# Patient Record
Sex: FEMALE | Race: WHITE | NOT HISPANIC OR LATINO | Employment: OTHER | URBAN - METROPOLITAN AREA
[De-identification: names, ages, dates, MRNs, and addresses within clinical notes are randomized per-mention and may not be internally consistent; named-entity substitution may affect disease eponyms.]

---

## 2023-04-10 PROBLEM — M25.562 ARTHRALGIA OF BOTH KNEES: Status: ACTIVE | Noted: 2023-04-10

## 2023-04-10 PROBLEM — F41.9 ANXIETY DISORDER: Status: ACTIVE | Noted: 2023-04-10

## 2023-04-10 PROBLEM — M54.50 LOWER BACK PAIN: Status: ACTIVE | Noted: 2023-04-10

## 2023-04-10 PROBLEM — M25.561 ARTHRALGIA OF BOTH KNEES: Status: ACTIVE | Noted: 2023-04-10

## 2023-04-10 PROBLEM — K21.9 GERD (GASTROESOPHAGEAL REFLUX DISEASE): Status: ACTIVE | Noted: 2023-04-10

## 2023-04-10 PROBLEM — I10 HYPERTENSION: Status: ACTIVE | Noted: 2023-04-10

## 2023-04-10 PROBLEM — H93.13 TINNITUS, SUBJECTIVE, BILATERAL: Status: ACTIVE | Noted: 2023-04-10

## 2023-04-10 PROBLEM — H90.3 SENSORINEURAL HEARING LOSS, BILATERAL: Status: ACTIVE | Noted: 2023-04-10

## 2023-04-10 PROBLEM — E78.5 HYPERLIPIDEMIA: Status: ACTIVE | Noted: 2023-04-10

## 2023-04-10 PROBLEM — J30.89 OTHER ALLERGIC RHINITIS: Status: ACTIVE | Noted: 2023-04-10

## 2023-04-10 PROBLEM — M25.50 ARTHRALGIA OF MULTIPLE JOINTS: Status: ACTIVE | Noted: 2023-04-10

## 2023-04-10 RX ORDER — FLUTICASONE PROPIONATE 50 MCG
2 SPRAY, SUSPENSION (ML) NASAL DAILY
COMMUNITY
Start: 2016-09-15

## 2023-04-10 RX ORDER — SPIRONOLACTONE 50 MG/1
1 TABLET, FILM COATED ORAL DAILY
COMMUNITY
Start: 2013-06-24 | End: 2023-08-07 | Stop reason: SDUPTHER

## 2023-04-10 RX ORDER — OMEPRAZOLE 20 MG/1
1 CAPSULE, DELAYED RELEASE ORAL DAILY
COMMUNITY
Start: 2019-06-28

## 2023-04-10 RX ORDER — ROSUVASTATIN CALCIUM 5 MG/1
1 TABLET, COATED ORAL DAILY
COMMUNITY
Start: 2017-10-25 | End: 2023-08-08 | Stop reason: SDUPTHER

## 2023-04-10 RX ORDER — ESCITALOPRAM OXALATE 10 MG/1
1 TABLET ORAL DAILY
COMMUNITY
Start: 2021-12-22 | End: 2023-10-24 | Stop reason: ALTCHOICE

## 2023-04-10 RX ORDER — METOPROLOL SUCCINATE 50 MG/1
1 TABLET, EXTENDED RELEASE ORAL DAILY
COMMUNITY
Start: 2017-04-19 | End: 2023-11-10 | Stop reason: SDUPTHER

## 2023-04-10 RX ORDER — PHENYLPROPANOLAMINE/CLEMASTINE 75-1.34MG
TABLET, EXTENDED RELEASE ORAL 2 TIMES DAILY
COMMUNITY
Start: 2020-11-19

## 2023-08-07 DIAGNOSIS — E78.5 HYPERLIPIDEMIA, UNSPECIFIED HYPERLIPIDEMIA TYPE: ICD-10-CM

## 2023-08-07 DIAGNOSIS — I10 HYPERTENSION, UNSPECIFIED TYPE: ICD-10-CM

## 2023-08-08 RX ORDER — SPIRONOLACTONE 50 MG/1
50 TABLET, FILM COATED ORAL DAILY
Qty: 90 TABLET | Refills: 1 | Status: SHIPPED | OUTPATIENT
Start: 2023-08-08 | End: 2023-08-08 | Stop reason: SDUPTHER

## 2023-08-08 RX ORDER — ROSUVASTATIN CALCIUM 5 MG/1
5 TABLET, COATED ORAL DAILY
Qty: 90 TABLET | Refills: 0 | Status: SHIPPED | OUTPATIENT
Start: 2023-08-08 | End: 2023-11-07

## 2023-08-08 RX ORDER — SPIRONOLACTONE 50 MG/1
50 TABLET, FILM COATED ORAL DAILY
Qty: 90 TABLET | Refills: 0 | Status: SHIPPED | OUTPATIENT
Start: 2023-08-08 | End: 2023-11-07

## 2023-08-08 NOTE — TELEPHONE ENCOUNTER
Requested Prescriptions     Pending Prescriptions Disp Refills    spironolactone (Aldactone) 50 mg tablet 90 tablet 0     Sig: Take 1 tablet (50 mg) by mouth once daily.    rosuvastatin (Crestor) 5 mg tablet 90 tablet 0     Sig: Take 1 tablet (5 mg) by mouth once daily.     Signed Prescriptions Disp Refills    spironolactone (Aldactone) 50 mg tablet 90 tablet 1     Sig: Take 1 tablet (50 mg) by mouth once daily.     Authorizing Provider: TATY AJ

## 2023-08-23 ENCOUNTER — LAB (OUTPATIENT)
Dept: LAB | Facility: LAB | Age: 63
End: 2023-08-23
Payer: COMMERCIAL

## 2023-08-23 DIAGNOSIS — E78.5 HYPERLIPIDEMIA, UNSPECIFIED HYPERLIPIDEMIA TYPE: ICD-10-CM

## 2023-08-23 LAB
ALANINE AMINOTRANSFERASE (SGPT) (U/L) IN SER/PLAS: 44 U/L (ref 7–45)
ALBUMIN (G/DL) IN SER/PLAS: 4.3 G/DL (ref 3.4–5)
ALKALINE PHOSPHATASE (U/L) IN SER/PLAS: 84 U/L (ref 33–136)
ANION GAP IN SER/PLAS: 14 MMOL/L (ref 10–20)
ASPARTATE AMINOTRANSFERASE (SGOT) (U/L) IN SER/PLAS: 26 U/L (ref 9–39)
BASOPHILS (10*3/UL) IN BLOOD BY AUTOMATED COUNT: 0.05 X10E9/L (ref 0–0.1)
BASOPHILS/100 LEUKOCYTES IN BLOOD BY AUTOMATED COUNT: 0.5 % (ref 0–2)
BILIRUBIN TOTAL (MG/DL) IN SER/PLAS: 0.4 MG/DL (ref 0–1.2)
CALCIUM (MG/DL) IN SER/PLAS: 9.3 MG/DL (ref 8.6–10.3)
CARBON DIOXIDE, TOTAL (MMOL/L) IN SER/PLAS: 21 MMOL/L (ref 21–32)
CHLORIDE (MMOL/L) IN SER/PLAS: 106 MMOL/L (ref 98–107)
CHOLESTEROL (MG/DL) IN SER/PLAS: 167 MG/DL (ref 0–199)
CHOLESTEROL IN HDL (MG/DL) IN SER/PLAS: 41.4 MG/DL
CHOLESTEROL/HDL RATIO: 4
CREATINE KINASE (U/L) IN SER/PLAS: 183 U/L (ref 0–215)
CREATININE (MG/DL) IN SER/PLAS: 0.83 MG/DL (ref 0.5–1.05)
EOSINOPHILS (10*3/UL) IN BLOOD BY AUTOMATED COUNT: 0.2 X10E9/L (ref 0–0.7)
EOSINOPHILS/100 LEUKOCYTES IN BLOOD BY AUTOMATED COUNT: 2.2 % (ref 0–6)
ERYTHROCYTE DISTRIBUTION WIDTH (RATIO) BY AUTOMATED COUNT: 13.2 % (ref 11.5–14.5)
ERYTHROCYTE MEAN CORPUSCULAR HEMOGLOBIN CONCENTRATION (G/DL) BY AUTOMATED: 32.9 G/DL (ref 32–36)
ERYTHROCYTE MEAN CORPUSCULAR VOLUME (FL) BY AUTOMATED COUNT: 92 FL (ref 80–100)
ERYTHROCYTES (10*6/UL) IN BLOOD BY AUTOMATED COUNT: 4.85 X10E12/L (ref 4–5.2)
GFR FEMALE: 78 ML/MIN/1.73M2
GLUCOSE (MG/DL) IN SER/PLAS: 87 MG/DL (ref 74–99)
HEMATOCRIT (%) IN BLOOD BY AUTOMATED COUNT: 44.4 % (ref 36–46)
HEMOGLOBIN (G/DL) IN BLOOD: 14.6 G/DL (ref 12–16)
IMMATURE GRANULOCYTES/100 LEUKOCYTES IN BLOOD BY AUTOMATED COUNT: 0.3 % (ref 0–0.9)
LDL: 98 MG/DL (ref 0–99)
LEUKOCYTES (10*3/UL) IN BLOOD BY AUTOMATED COUNT: 9.3 X10E9/L (ref 4.4–11.3)
LYMPHOCYTES (10*3/UL) IN BLOOD BY AUTOMATED COUNT: 2.99 X10E9/L (ref 1.2–4.8)
LYMPHOCYTES/100 LEUKOCYTES IN BLOOD BY AUTOMATED COUNT: 32.2 % (ref 13–44)
MAGNESIUM (MG/DL) IN SER/PLAS: 2.19 MG/DL (ref 1.6–2.4)
MONOCYTES (10*3/UL) IN BLOOD BY AUTOMATED COUNT: 0.63 X10E9/L (ref 0.1–1)
MONOCYTES/100 LEUKOCYTES IN BLOOD BY AUTOMATED COUNT: 6.8 % (ref 2–10)
NEUTROPHILS (10*3/UL) IN BLOOD BY AUTOMATED COUNT: 5.39 X10E9/L (ref 1.2–7.7)
NEUTROPHILS/100 LEUKOCYTES IN BLOOD BY AUTOMATED COUNT: 58 % (ref 40–80)
PLATELETS (10*3/UL) IN BLOOD AUTOMATED COUNT: 278 X10E9/L (ref 150–450)
POTASSIUM (MMOL/L) IN SER/PLAS: 4.8 MMOL/L (ref 3.5–5.3)
PROTEIN TOTAL: 6.8 G/DL (ref 6.4–8.2)
SODIUM (MMOL/L) IN SER/PLAS: 136 MMOL/L (ref 136–145)
THYROTROPIN (MIU/L) IN SER/PLAS BY DETECTION LIMIT <= 0.05 MIU/L: 2.48 MIU/L (ref 0.44–3.98)
TRIGLYCERIDE (MG/DL) IN SER/PLAS: 140 MG/DL (ref 0–149)
UREA NITROGEN (MG/DL) IN SER/PLAS: 21 MG/DL (ref 6–23)
VLDL: 28 MG/DL (ref 0–40)

## 2023-08-23 PROCEDURE — 85025 COMPLETE CBC W/AUTO DIFF WBC: CPT

## 2023-08-23 PROCEDURE — 84443 ASSAY THYROID STIM HORMONE: CPT

## 2023-08-23 PROCEDURE — 82550 ASSAY OF CK (CPK): CPT

## 2023-08-23 PROCEDURE — 83735 ASSAY OF MAGNESIUM: CPT

## 2023-08-23 PROCEDURE — 80061 LIPID PANEL: CPT

## 2023-08-23 PROCEDURE — 36415 COLL VENOUS BLD VENIPUNCTURE: CPT

## 2023-08-23 PROCEDURE — 80053 COMPREHEN METABOLIC PANEL: CPT

## 2023-08-29 NOTE — PROGRESS NOTES
Subjective   Patient ID: Judith Gr is a 63 y.o. female who presents for Annual Exam, Hospital Follow-up, and Vertigo.    HPI     Review of Systems    Objective   Wt 95.3 kg (210 lb)   BMI 32.89 kg/m²     Physical Exam    Assessment/Plan

## 2023-09-01 ENCOUNTER — OFFICE VISIT (OUTPATIENT)
Dept: PRIMARY CARE | Facility: CLINIC | Age: 63
End: 2023-09-01
Payer: COMMERCIAL

## 2023-09-01 VITALS
HEART RATE: 69 BPM | SYSTOLIC BLOOD PRESSURE: 128 MMHG | BODY MASS INDEX: 32.96 KG/M2 | DIASTOLIC BLOOD PRESSURE: 71 MMHG | HEIGHT: 67 IN | OXYGEN SATURATION: 98 % | WEIGHT: 210 LBS

## 2023-09-01 DIAGNOSIS — I10 PRIMARY HYPERTENSION: ICD-10-CM

## 2023-09-01 DIAGNOSIS — E78.2 MIXED HYPERLIPIDEMIA: ICD-10-CM

## 2023-09-01 DIAGNOSIS — F41.0 PANIC DISORDER WITHOUT AGORAPHOBIA: ICD-10-CM

## 2023-09-01 DIAGNOSIS — Z00.00 HEALTH CARE MAINTENANCE: Primary | ICD-10-CM

## 2023-09-01 DIAGNOSIS — Z12.31 VISIT FOR SCREENING MAMMOGRAM: ICD-10-CM

## 2023-09-01 PROCEDURE — 3074F SYST BP LT 130 MM HG: CPT | Performed by: INTERNAL MEDICINE

## 2023-09-01 PROCEDURE — 99213 OFFICE O/P EST LOW 20 MIN: CPT | Performed by: INTERNAL MEDICINE

## 2023-09-01 PROCEDURE — 99396 PREV VISIT EST AGE 40-64: CPT | Performed by: INTERNAL MEDICINE

## 2023-09-01 PROCEDURE — 3078F DIAST BP <80 MM HG: CPT | Performed by: INTERNAL MEDICINE

## 2023-09-01 PROCEDURE — 4004F PT TOBACCO SCREEN RCVD TLK: CPT | Performed by: INTERNAL MEDICINE

## 2023-09-01 RX ORDER — MECLIZINE HYDROCHLORIDE 25 MG/1
25 TABLET ORAL 3 TIMES DAILY PRN
COMMUNITY
Start: 2023-08-30

## 2023-09-01 RX ORDER — ONDANSETRON 4 MG/1
TABLET, ORALLY DISINTEGRATING ORAL
COMMUNITY
Start: 2023-08-30

## 2023-09-01 ASSESSMENT — PATIENT HEALTH QUESTIONNAIRE - PHQ9
1. LITTLE INTEREST OR PLEASURE IN DOING THINGS: NOT AT ALL
SUM OF ALL RESPONSES TO PHQ9 QUESTIONS 1 AND 2: 1
2. FEELING DOWN, DEPRESSED OR HOPELESS: SEVERAL DAYS

## 2023-09-01 NOTE — PROGRESS NOTES
"Subjective   Patient ID: Judith Gr is a 63 y.o. female who presents for Annual Exam, Hospital Follow-up, and Vertigo.    A lot of family health issues going on and has been stressful    Yesterday got very dizzy, was severe and made her want to vomit  She was worried that she was having a heart attack, her bp was 155/?  She couldn't talk or see, repeat bp was 182/?, then 15min later her bp went to 192/88  Called 911, went to ER, continued to be nauseated and dizzy  Has had a mild cough and runny nose, but gets that this time of year    She has still been depressed even on the escitalopram.  Feels lethargic, very sad and tearful, if she doesn't kick out of it she will get a panic attack with rapid heart beat and couldn't breath and feeling she needs to run  About 2 months ago noted that occasionally those attacks will come back  She talks herself out of going to her dark place  She takes the dogs on walks, she doesn't drive when she feels that way    No cp or pressure  No sob  If she overworks in the yard, if she is shoveling or working she gets an epigastric discomfort  Her insurance got her an invite to the [employer] program  She is being more active  She is upset that she is gaining weight.   She started to do Danial chi  She does a lot of outdoor work  Her arthritis has been bothering her more.   Bowels are more constipated due to the contrast, in general have been good.                Review of Systems    Objective   /71   Pulse 69   Ht 1.702 m (5' 7\")   Wt 95.3 kg (210 lb)   SpO2 98%   BMI 32.89 kg/m²     Physical Exam  Constitutional:       Appearance: Normal appearance.   Neck:      Vascular: No carotid bruit.   Cardiovascular:      Rate and Rhythm: Normal rate and regular rhythm.      Heart sounds: No murmur heard.  Pulmonary:      Effort: Pulmonary effort is normal.      Breath sounds: Normal breath sounds.   Musculoskeletal:      Right lower leg: No edema.      Left lower leg: No edema. "   Lymphadenopathy:      Cervical: No cervical adenopathy.   Neurological:      Mental Status: She is alert.      Comments: No pronator drift  Finger to nose intact  Moore pike positive   Psychiatric:         Behavior: Behavior normal.         Assessment/Plan          Patient Instructions   Hypertension is well controlled, continue same medications    Cholesterol is great, continue rosuvastatin    Ct head reviewed, no stroke, no blockage, no aneurysm    Dizziness is likely viral, reproducible, if does not resolve in 2 weeks would consider vestibular therapy    Call [phone] to schedule mammogram    Bone density due in 2030    Colonoscopy due in 2030    Consider covid and flu shots in the fall/ocober    Prevnar 20 due after age 65    Anxiety, will check anxiety and depression scores  On escitalopram  Call and schedule with Sarah Gonzalez, counseling for anxiety and depressioin

## 2023-09-01 NOTE — PATIENT INSTRUCTIONS
Hypertension is well controlled, continue same medications    Cholesterol is great, continue rosuvastatin    Ct head reviewed, no stroke, no blockage, no aneurysm    Dizziness is likely viral, reproducible, if does not resolve in 2 weeks would consider vestibular therapy    Call [phone] to schedule mammogram    Bone density due in 2030    Colonoscopy due in 2030    Consider covid and flu shots in the fall/ocober    Prevnar 20 due after age 65    Anxiety, will check anxiety and depression scores  On escitalopram  Call and schedule with Sarah Gonzalez, counseling for anxiety and depressioin

## 2023-10-21 RX ORDER — METOPROLOL TARTRATE 50 MG/1
50 TABLET ORAL 2 TIMES DAILY
COMMUNITY
End: 2023-11-10 | Stop reason: CLARIF

## 2023-10-24 ENCOUNTER — OFFICE VISIT (OUTPATIENT)
Dept: OTOLARYNGOLOGY | Facility: CLINIC | Age: 63
End: 2023-10-24
Payer: COMMERCIAL

## 2023-10-24 VITALS — WEIGHT: 211.7 LBS | TEMPERATURE: 97.4 F | BODY MASS INDEX: 33.16 KG/M2

## 2023-10-24 DIAGNOSIS — H90.3 SENSORINEURAL HEARING LOSS (SNHL) OF BOTH EARS: ICD-10-CM

## 2023-10-24 DIAGNOSIS — M54.2 NECK PAIN: ICD-10-CM

## 2023-10-24 DIAGNOSIS — R42 VERTIGO: Primary | ICD-10-CM

## 2023-10-24 PROCEDURE — 99214 OFFICE O/P EST MOD 30 MIN: CPT | Performed by: NURSE PRACTITIONER

## 2023-10-24 PROCEDURE — 4004F PT TOBACCO SCREEN RCVD TLK: CPT | Performed by: NURSE PRACTITIONER

## 2023-10-24 ASSESSMENT — PATIENT HEALTH QUESTIONNAIRE - PHQ9
SUM OF ALL RESPONSES TO PHQ9 QUESTIONS 1 AND 2: 0
2. FEELING DOWN, DEPRESSED OR HOPELESS: NOT AT ALL
1. LITTLE INTEREST OR PLEASURE IN DOING THINGS: NOT AT ALL

## 2023-10-24 NOTE — PROGRESS NOTES
Subjective   Patient ID: Judith Gr is a 63 y.o. female who presents for Vertigo.  HPI  This patient is referred for evaluation of  episodic vertiginous sometimes positional dizziness. The patient is accompanied by her . The approximate duration of her complaints is almost 2 months.    The patient describes her dizziness as sudden onset of vertigo on 8/30/2023.  Patient reports that spinning lasted hours nonstop.  She was evaluated in the ED.  CT angio with contrast head and neck and CT head without contrast did not show any significant findings.  Once the spinning stopped, she is felt off balanced when walking.  She also reports occasional positionally triggered episodes of vertigo that are short lasting.  Of note, when she had the severe episode she recalls having neck pain in conjunction.  She has had several significant life events leading to increased stress.  When asked about ear pain, headache, phono-photophobia, visual or motion intolerance, sound or pressure induced symptoms, hearing loss, discharge from ear, tinnitus, aural fullness or autophony, the patient admits to history of migraines (no recent pain but has developed ocular migraines), photosensitivity, lifelong motion intolerance, bilateral hearing loss.  She has worn hearing aids for years.  She recently got new hearing aids and is uncertain if she is not happy with their performance or if her hearing loss has progressed.  She really only notices difficulty with talking to her  who has a low pitched voice.  She has previously been seen by Dr. Love.  Patient reports seeing her PCP after her ED visit who described positionally triggered nystagmus concerning for BPPV.    Review of Systems  A comprehensive or 10 points review of the patient´s constitutional, neurological, HEENT, pulmonary, cardiovascular and genito-urinary systems showed only those mentioned in history of present illness.    Objective   Physical Exam  Constitutional:  no fever, chills, weight loss or weight gain   General appearance: Appears well, well-nourished, well groomed. No acute distress.   Communication: Normal communication   Psychiatric: Oriented to person, place and time. Normal mood and affect.   Neurologic: Cranial nerves II-XII grossly intact and symmetric bilaterally.   Head and Face:   Head: Atraumatic with no masses, lesions or scarring.   Face: Normal symmetry, no paralysis, synkinesis or facial tic. No scars or deformities.     Eyes: Conjunctiva not edematous or erythematous   Ears: External inspection of ears with no deformity, scars or masses. Bilateral EACs clear and bilateral TMs intact with no signs of effusions     Neck: Normal appearing, symmetric, trachea midline.   Cardiovascular: Examination of peripheral vascular system shows no clubbing or cyanosis.   Respiratory: No respiratory distress increased work of breathing. Inspection of the chest with symmetric chest expansion and normal respiratory effort.   Skin: No rashes in the head or neck  Bedside occulomotor function assessment for ocular pursuits and saccades, spontaneous nystagmus was normal.  Head thrust negative bilaterally  Romberg unsteady, patient swaying right  Fukuda with wide-based gait.  Patient reports feeling pulled to the left, but no turning noted  Tandem gait normal  Sailaja-Hallpike negative bilaterally  My interpretation of the audiogram done 9/11/2023 is moderate to severe sensorineural hearing loss bilaterally.  Word recognition scores 88% on the right and 84% on the left.  Normal tympanograms bilaterally.  This is stable when compared to previous audiogram dated 9/12/2022.  Assessment/Plan     This patient presents for initial evaluation of acute acquired vertigo and neck pain as well as chronic bilateral sensorineural hearing loss.    Reassurance given that otologic exam today is normal.  Audiogram was reviewed in detail and has remained stable.  The physiology of balance control  was explained. The likely possible etiologies were reviewed. I believe the patient may have a peripheral vestibular disorder. I recommended further evaluation with VNG testing.  Pending those results, I will recommend PT.  If there is no vestibular hypofunction, I would recommend a general PT referral.  If there is vestibular hypofunction I would recommend a vestibular PT referral.  I am happy to discuss testing results over the phone.  We discussed that her description of symptoms with an unchanged audiogram is consistent with vestibular neuritis +/- BPPV versus migraine variant +/- cervicogenic dizziness.  If symptoms were caused by vestibular neuritis, she should not have any recurrence of long-lasting vertiginous episodes, but may have intermittent recurrences of BPPV.  If symptoms were migraine versus cervicogenic, PT may prevent recurrence.  Patient and family are in agreement with the plan.  All questions were answered to patient and family's satisfaction.      30 minutes was spent on this patient´s visit. More than 50% of that time was spent in counseling regarding the possible etiologies, test results, treatment options and coordinating care.    This note was created using speech recognition transcription software. Despite proofreading, several typographical errors might be present that might affect the meaning of the content. Please call with any questions.

## 2023-11-06 DIAGNOSIS — E78.5 HYPERLIPIDEMIA, UNSPECIFIED HYPERLIPIDEMIA TYPE: ICD-10-CM

## 2023-11-06 DIAGNOSIS — I10 HYPERTENSION, UNSPECIFIED TYPE: ICD-10-CM

## 2023-11-06 NOTE — TELEPHONE ENCOUNTER
Requested Prescriptions     Pending Prescriptions Disp Refills    rosuvastatin (Crestor) 5 mg tablet [Pharmacy Med Name: ROSUVASTATIN CALCIUM 5 MG TAB] 90 tablet 0     Sig: take 1 tablet by mouth once daily    spironolactone (Aldactone) 50 mg tablet [Pharmacy Med Name: SPIRONOLACTONE 50 MG TABLET] 90 tablet 0     Sig: take 1 tablet by mouth once daily

## 2023-11-07 RX ORDER — SPIRONOLACTONE 50 MG/1
50 TABLET, FILM COATED ORAL DAILY
Qty: 90 TABLET | Refills: 1 | Status: SHIPPED | OUTPATIENT
Start: 2023-11-07 | End: 2024-05-13 | Stop reason: SDUPTHER

## 2023-11-07 RX ORDER — ROSUVASTATIN CALCIUM 5 MG/1
5 TABLET, COATED ORAL DAILY
Qty: 90 TABLET | Refills: 1 | Status: SHIPPED | OUTPATIENT
Start: 2023-11-07 | End: 2024-05-07

## 2023-11-10 DIAGNOSIS — I10 PRIMARY HYPERTENSION: ICD-10-CM

## 2023-11-10 NOTE — TELEPHONE ENCOUNTER
Requested Prescriptions     Pending Prescriptions Disp Refills    metoprolol succinate XL (Toprol-XL) 50 mg 24 hr tablet 90 tablet 0     Sig: Take 1 tablet (50 mg) by mouth once daily.

## 2023-11-13 RX ORDER — METOPROLOL SUCCINATE 50 MG/1
50 TABLET, EXTENDED RELEASE ORAL DAILY
Qty: 90 TABLET | Refills: 2 | Status: SHIPPED | OUTPATIENT
Start: 2023-11-13

## 2024-01-12 ENCOUNTER — OFFICE VISIT (OUTPATIENT)
Dept: PRIMARY CARE | Facility: CLINIC | Age: 64
End: 2024-01-12
Payer: COMMERCIAL

## 2024-01-12 ENCOUNTER — ANCILLARY PROCEDURE (OUTPATIENT)
Dept: RADIOLOGY | Facility: CLINIC | Age: 64
End: 2024-01-12
Payer: COMMERCIAL

## 2024-01-12 VITALS
HEIGHT: 67 IN | WEIGHT: 210 LBS | BODY MASS INDEX: 32.96 KG/M2 | SYSTOLIC BLOOD PRESSURE: 120 MMHG | DIASTOLIC BLOOD PRESSURE: 83 MMHG | HEART RATE: 79 BPM

## 2024-01-12 DIAGNOSIS — J40 BRONCHITIS: ICD-10-CM

## 2024-01-12 DIAGNOSIS — R05.1 ACUTE COUGH: Primary | ICD-10-CM

## 2024-01-12 DIAGNOSIS — R05.1 ACUTE COUGH: ICD-10-CM

## 2024-01-12 DIAGNOSIS — H69.93 DYSFUNCTION OF BOTH EUSTACHIAN TUBES: ICD-10-CM

## 2024-01-12 PROCEDURE — 3079F DIAST BP 80-89 MM HG: CPT | Performed by: INTERNAL MEDICINE

## 2024-01-12 PROCEDURE — 3074F SYST BP LT 130 MM HG: CPT | Performed by: INTERNAL MEDICINE

## 2024-01-12 PROCEDURE — 87637 SARSCOV2&INF A&B&RSV AMP PRB: CPT

## 2024-01-12 PROCEDURE — 71046 X-RAY EXAM CHEST 2 VIEWS: CPT

## 2024-01-12 PROCEDURE — 99214 OFFICE O/P EST MOD 30 MIN: CPT | Performed by: INTERNAL MEDICINE

## 2024-01-12 PROCEDURE — 71046 X-RAY EXAM CHEST 2 VIEWS: CPT | Performed by: RADIOLOGY

## 2024-01-12 RX ORDER — METHYLPREDNISOLONE 4 MG/1
TABLET ORAL
Qty: 21 TABLET | Refills: 0 | Status: SHIPPED | OUTPATIENT
Start: 2024-01-12 | End: 2024-01-19

## 2024-01-12 RX ORDER — BENZONATATE 200 MG/1
200 CAPSULE ORAL 3 TIMES DAILY PRN
Qty: 42 CAPSULE | Refills: 0 | Status: SHIPPED | OUTPATIENT
Start: 2024-01-12 | End: 2024-02-11

## 2024-01-12 RX ORDER — AZITHROMYCIN 250 MG/1
TABLET, FILM COATED ORAL
Qty: 6 TABLET | Refills: 0 | Status: SHIPPED | OUTPATIENT
Start: 2024-01-12 | End: 2024-01-17

## 2024-01-12 ASSESSMENT — ENCOUNTER SYMPTOMS
COUGH: 1
FATIGUE: 1

## 2024-01-12 NOTE — PROGRESS NOTES
"Subjective   Patient ID: Judith Gr is a 63 y.o. female who presents for Sinusitis, Cough, and Fatigue.     Got sick about 6 days ago  Her  had illness about 3 days prior  Started with sneezing, then started coughing.   She is coughing non stop  Walking seems to make it better  Her chest hurts in the lower chest  She is have stress incontinence with the coughing  She has been taking mucinex and helps for about 2 hours and gives her a headache  Her whole head hurts and is losing her hearing  Is mostly clear phlegm and nasal drainage    No fevers  No body aches other than her chest.     Sinusitis  Associated symptoms include coughing.   Cough    Fatigue  Associated symptoms include coughing and fatigue.        Review of Systems   Constitutional:  Positive for fatigue.   Respiratory:  Positive for cough.        Objective   /83   Pulse 79   Ht 1.702 m (5' 7\")   Wt 95.3 kg (210 lb)   BMI 32.89 kg/m²     Physical Exam  Constitutional:       Appearance: Normal appearance.   HENT:      Ears:      Comments: EAC normal  TM retracted b/l  No fluid  Slight redness on the left     Nose:      Comments: Turbs pink and edematous with clear drainage  Cardiovascular:      Rate and Rhythm: Normal rate and regular rhythm.   Pulmonary:      Effort: Pulmonary effort is normal.      Comments: Focal wheeze in the left lower lobe, not resolving with cough  B/l rhonchi    Neurological:      Mental Status: She is alert.         Assessment/Plan          Patient Instructions   Cough with focal \"noise\" in left lower chest  Sending for covid , rsv and flu testing  Get chest xray, building [facility]  Take medrol mago to decongest your ears/sinuses  Take azithromycin to cover walking pneumonia and bronchitis  If pneumonia on chest xray, will add ceftin, second antibiotic  Call if not better, rash or diarrhea   "

## 2024-01-12 NOTE — PATIENT INSTRUCTIONS
"Cough with focal \"noise\" in left lower chest  Sending for covid , rsv and flu testing  Get chest xray, building [facility]  Take medrol mago to decongest your ears/sinuses  Take azithromycin to cover walking pneumonia and bronchitis  If pneumonia on chest xray, will add ceftin, second antibiotic  Call if not better, rash or diarrhea  "

## 2024-01-12 NOTE — PROGRESS NOTES
Subjective   Patient ID: Judith Gr is a 63 y.o. female who presents for Sinusitis.    HPI     Review of Systems    Objective   There were no vitals taken for this visit.    Physical Exam    Assessment/Plan

## 2024-01-13 LAB
FLUAV RNA RESP QL NAA+PROBE: NOT DETECTED
FLUBV RNA RESP QL NAA+PROBE: NOT DETECTED
RSV RNA RESP QL NAA+PROBE: DETECTED
SARS-COV-2 RNA RESP QL NAA+PROBE: NOT DETECTED

## 2024-02-01 DIAGNOSIS — J12.1 PNEUMONIA DUE TO RESPIRATORY SYNCYTIAL VIRUS (RSV): Primary | ICD-10-CM

## 2024-04-09 ENCOUNTER — OFFICE VISIT (OUTPATIENT)
Dept: ORTHOPEDIC SURGERY | Facility: CLINIC | Age: 64
End: 2024-04-09
Payer: MEDICARE

## 2024-04-09 DIAGNOSIS — M65.332 ACQUIRED TRIGGER FINGER OF LEFT MIDDLE FINGER: Primary | ICD-10-CM

## 2024-04-09 PROCEDURE — 20550 NJX 1 TENDON SHEATH/LIGAMENT: CPT | Performed by: ORTHOPAEDIC SURGERY

## 2024-04-09 PROCEDURE — 99203 OFFICE O/P NEW LOW 30 MIN: CPT | Performed by: ORTHOPAEDIC SURGERY

## 2024-04-09 PROCEDURE — 1160F RVW MEDS BY RX/DR IN RCRD: CPT | Performed by: ORTHOPAEDIC SURGERY

## 2024-04-09 PROCEDURE — 99213 OFFICE O/P EST LOW 20 MIN: CPT | Performed by: ORTHOPAEDIC SURGERY

## 2024-04-09 PROCEDURE — 1125F AMNT PAIN NOTED PAIN PRSNT: CPT | Performed by: ORTHOPAEDIC SURGERY

## 2024-04-09 PROCEDURE — 1159F MED LIST DOCD IN RCRD: CPT | Performed by: ORTHOPAEDIC SURGERY

## 2024-04-09 PROCEDURE — 76942 ECHO GUIDE FOR BIOPSY: CPT | Performed by: ORTHOPAEDIC SURGERY

## 2024-04-09 PROCEDURE — 2500000005 HC RX 250 GENERAL PHARMACY W/O HCPCS: Performed by: ORTHOPAEDIC SURGERY

## 2024-04-09 PROCEDURE — 2500000004 HC RX 250 GENERAL PHARMACY W/ HCPCS (ALT 636 FOR OP/ED): Performed by: ORTHOPAEDIC SURGERY

## 2024-04-09 RX ORDER — LIDOCAINE HYDROCHLORIDE 10 MG/ML
0.5 INJECTION INFILTRATION; PERINEURAL
Status: COMPLETED | OUTPATIENT
Start: 2024-04-09 | End: 2024-04-09

## 2024-04-09 RX ORDER — METHYLPREDNISOLONE ACETATE 40 MG/ML
20 INJECTION, SUSPENSION INTRA-ARTICULAR; INTRALESIONAL; INTRAMUSCULAR; SOFT TISSUE
Status: COMPLETED | OUTPATIENT
Start: 2024-04-09 | End: 2024-04-09

## 2024-04-09 RX ADMIN — LIDOCAINE HYDROCHLORIDE 0.5 ML: 10 INJECTION, SOLUTION INFILTRATION; PERINEURAL at 10:32

## 2024-04-09 RX ADMIN — METHYLPREDNISOLONE ACETATE 20 MG: 40 INJECTION, SUSPENSION INTRA-ARTICULAR; INTRALESIONAL; INTRAMUSCULAR; INTRASYNOVIAL; SOFT TISSUE at 10:32

## 2024-04-09 ASSESSMENT — ENCOUNTER SYMPTOMS
ARTHRALGIAS: 1
FEVER: 0
CHILLS: 0
WHEEZING: 0
SHORTNESS OF BREATH: 0
FATIGUE: 0

## 2024-04-09 ASSESSMENT — PAIN - FUNCTIONAL ASSESSMENT: PAIN_FUNCTIONAL_ASSESSMENT: 0-10

## 2024-04-09 ASSESSMENT — PAIN SCALES - GENERAL: PAINLEVEL_OUTOF10: 4

## 2024-04-09 NOTE — PROGRESS NOTES
Reason for Appointment  Chief Complaint   Patient presents with    Left Middle Finger - Trigger Finger     History of Present Illness  New patient is a 64 y.o. female here today for evaluation of a left long trigger finger.  Her finger started clicking and sticking about 2 weeks ago. She has started wearing a splint on the long finger. No other fingers are triggering. She is right-hand dominant and worked as a  for years. She also has numbness at the tips of all her fingers on the left hand and has a history of a distal radius fracture.     Past Medical History:   Diagnosis Date    Abnormal levels of other serum enzymes 07/30/2015    Elevated liver enzymes    Left lower quadrant pain 11/12/2013    Abdominal pain, LLQ (left lower quadrant)    Other fecal abnormalities 05/15/2014    Loose stools    Other specified anxiety disorders 10/24/2017    Depression with anxiety    Pain in joints of right hand 05/08/2018    Arthralgia of right hand    Pain in unspecified shoulder 05/14/2014    Shoulder pain    Personal history of other diseases of the circulatory system 05/15/2014    History of superficial phlebitis    Personal history of other diseases of the nervous system and sense organs 07/30/2015    History of tinnitus    Personal history of other diseases of the respiratory system 08/05/2013    History of sinusitis    Personal history of other specified conditions 04/09/2014    History of chest pain    Personal history of other specified conditions 05/15/2014    History of epigastric pain    Personal history of urinary (tract) infections 11/12/2013    History of urinary tract infection       Past Surgical History:   Procedure Laterality Date    OTHER SURGICAL HISTORY  01/21/2022    Cholecystectomy    OTHER SURGICAL HISTORY  01/21/2022    Forearm fracture repair    OTHER SURGICAL HISTORY  01/21/2022    Total hysterectomy with removal of both tubes and ovaries    OTHER SURGICAL HISTORY  01/21/2022    Appendectomy        Medication Documentation Review Audit       Reviewed by Libertad Sanches MA (Medical Assistant) on 04/09/24 at 1008      Medication Order Taking? Sig Documenting Provider Last Dose Status   esomeprazole magnesium (NEXIUM ORAL) [id number] Yes orally once a day Historical Provider, MD Taking Active   fluticasone (Flonase) 50 mcg/actuation nasal spray [id number] Yes Administer 2 sprays into affected nostril(s) once daily. Historical Provider, MD Taking Active   ibuprofen (Motrin) capsule [id number] Yes Take by mouth twice a day. Historical Provider, MD Taking Active   meclizine (Antivert) 25 mg tablet [id number] Yes Take 1 tablet (25 mg) by mouth 3 times a day as needed for dizziness. Historical Provider, MD Taking Active   metoprolol succinate XL (Toprol-XL) 50 mg 24 hr tablet [id number] Yes Take 1 tablet (50 mg) by mouth once daily. Joanna Luu,  Taking Active   omeprazole (PriLOSEC) 20 mg DR capsule [id number] Yes Take 1 capsule (20 mg) by mouth once daily. Historical Provider, MD Taking Active   ondansetron ODT (Zofran-ODT) 4 mg disintegrating tablet [id number] Yes dissolve 1 tablet by mouth three times a day if needed for nausea Historical Provider, MD Taking Active   rosuvastatin (Crestor) 5 mg tablet [id number] Yes take 1 tablet by mouth once daily Joanna Luu,  Taking Active   spironolactone (Aldactone) 50 mg tablet [id number] Yes take 1 tablet by mouth once daily Joanna Luu, DO Taking Active                    Allergies   Allergen Reactions    Other Unknown    Sulfa (Sulfonamide Antibiotics) Hives    Levofloxacin Rash       Review of Systems   Constitutional:  Negative for chills, fatigue and fever.   Respiratory:  Negative for shortness of breath and wheezing.    Cardiovascular:  Negative for chest pain and leg swelling.   Musculoskeletal:  Positive for arthralgias.   All other systems reviewed and are negative.      Exam   On exam, there is good neck shoulder and elbow ROM, hands show mild  osteoarthritis findings, CMC arthritis right greater than left, there may be some early triggering in the right long finger, left long has significant A1 pulley tenderness and palpable triggering, no other triggering, negative Tinel's median nerves    Assessment   Encounter Diagnosis   Name Primary?    Acquired trigger finger of left middle finger Yes       Plan   Today we discussed conservative treatment. At this point, the patient is experiencing left long finger pain that is consistent with trigger finger on clinical examination. We will do one cortisone injection into the left long A1 pulley tendon sheath in hopes to calm their symptoms nicely. Patient will follow-up in four weeks. We did discuss possible surgical release in the future when symptoms recur. She can continue wearing the brace at night and with lifting.     Patient ID: Judith Gr is a 64 y.o. female.    Hand / UE Inj/Asp: L long A1 for trigger finger on 4/9/2024 10:32 AM  Indications: pain  Details: 25 G needle, ultrasound-guided  Medications: 0.5 mL lidocaine 10 mg/mL (1 %); 20 mg methylPREDNISolone acetate 40 mg/mL  Outcome: tolerated well, no immediate complications    After discussing the risks and benefits of the procedure we proceeded with the injection. Under ultrasound guidance we identified the metacarpal bone and overlying tendon sheath with the FDS and FDP tendons, images obtained. We then sterilely injected the left long A1 pulley with a mixture of 20 mg of DepoMedrol and .5 cc of 1% lidocaine. Pt tolerated the procedure well without any adverse reactions.    Procedure, treatment alternatives, risks and benefits explained, specific risks discussed. Consent was given by the patient. Immediately prior to procedure a time out was called to verify the correct patient, procedure, equipment, support staff and site/side marked as required. Patient was prepped and draped in the usual sterile fashion.           Nancy CASILLAS attest  that this documentation has been prepared under the direction and in the presence of Uri Coyne MD. By signing below, I, Uri Coyne MD, personally performed the services described in this documentation. All medical record entries made by the scribe were at my direction and in my presence. I have reviewed the chart and agree that the record reflects my personal performance and is accurate and complete. 04/09/24

## 2024-05-06 DIAGNOSIS — I10 HYPERTENSION, UNSPECIFIED TYPE: ICD-10-CM

## 2024-05-07 RX ORDER — ROSUVASTATIN CALCIUM 5 MG/1
5 TABLET, COATED ORAL DAILY
Qty: 90 TABLET | Refills: 1 | Status: SHIPPED | OUTPATIENT
Start: 2024-05-07

## 2024-05-07 NOTE — TELEPHONE ENCOUNTER
Requested Prescriptions     Pending Prescriptions Disp Refills    rosuvastatin (Crestor) 5 mg tablet [Pharmacy Med Name: ROSUVASTATIN CALCIUM 5 MG TAB] 90 tablet 1     Sig: TAKE 1 TABLET BY MOUTH ONCE DAILY

## 2024-05-13 DIAGNOSIS — E78.5 HYPERLIPIDEMIA, UNSPECIFIED HYPERLIPIDEMIA TYPE: ICD-10-CM

## 2024-05-13 NOTE — TELEPHONE ENCOUNTER
Requested Prescriptions     Pending Prescriptions Disp Refills    spironolactone (Aldactone) 50 mg tablet 90 tablet 1     Sig: Take 1 tablet (50 mg) by mouth once daily.

## 2024-05-14 RX ORDER — SPIRONOLACTONE 50 MG/1
50 TABLET, FILM COATED ORAL DAILY
Qty: 90 TABLET | Refills: 0 | Status: SHIPPED | OUTPATIENT
Start: 2024-05-14

## 2024-07-17 DIAGNOSIS — I10 PRIMARY HYPERTENSION: ICD-10-CM

## 2024-07-19 RX ORDER — METOPROLOL SUCCINATE 50 MG/1
50 TABLET, EXTENDED RELEASE ORAL DAILY
Qty: 90 TABLET | Refills: 1 | Status: SHIPPED | OUTPATIENT
Start: 2024-07-19

## 2024-09-03 DIAGNOSIS — E78.5 HYPERLIPIDEMIA, UNSPECIFIED HYPERLIPIDEMIA TYPE: ICD-10-CM

## 2024-09-05 RX ORDER — SPIRONOLACTONE 50 MG/1
50 TABLET, FILM COATED ORAL DAILY
Qty: 90 TABLET | Refills: 0 | Status: SHIPPED | OUTPATIENT
Start: 2024-09-05

## 2024-09-12 ENCOUNTER — CLINICAL SUPPORT (OUTPATIENT)
Dept: AUDIOLOGY | Facility: CLINIC | Age: 64
End: 2024-09-12
Payer: MEDICARE

## 2024-09-12 DIAGNOSIS — H93.13 TINNITUS, SUBJECTIVE, BILATERAL: ICD-10-CM

## 2024-09-12 DIAGNOSIS — H90.3 SENSORINEURAL HEARING LOSS, BILATERAL: Primary | ICD-10-CM

## 2024-09-12 PROCEDURE — 92557 COMPREHENSIVE HEARING TEST: CPT

## 2024-09-12 PROCEDURE — 92550 TYMPANOMETRY & REFLEX THRESH: CPT

## 2024-09-12 NOTE — PROGRESS NOTES
ADULT AUDIOLOGY AUDIOMETRIC EVALUATION    Name:  Judith Raya  :  1960  Age:  64 y.o.  Date of Evaluation:  2024    HISTORY  JUDITH RAYA, 63 year-old female, was seen today for an annual hearing evaluation, per referral by Ricky Holland MD for re-evaluation of a known bilateral sensorineural hearing loss. She reports new difficulty hearing with her hearing aids obtained at [facility]; but this could be due to increased stress with her aging parents and family business.      Ms. Raya reports experiencing vertiginous dizziness and nausea triggered by head and body movement, beginning 1 week ago. She reports the initial episode of vertigo lasted about 4-5 hours and occurred after standing from a seated position. She reports since visiting the hospital and her primary care physician. She reports her primary care physician suspected the vertigo to be due to 'crystals' and suspected involvement in her left ear. Ms. Tsai reports experiencing 1-3 episodes per day since her initial episode. She also reports bilateral ocular migraines. She reports 3 primary positional triggers are standing, bending over, and quick head/body movement.      Ms. Raya denied subjective change in hearing. She reported she recently bought [place] hearing aids from [employer] ([employer] [id number] [id number]). She reports she is not satisfied with her new hearings aids. Reported continued constant bilateral tinnitus. Her most recent audiometric evaluation from 2020 showed moderate sloping to severe sensorineural hearing loss bilaterally.     AUDIOLOGIC EVALUATION    OTOSCOPY  Otoscopic inspection revealed clear canals and visualization of the eardrum bilaterally.    IMMITTANCE  Normal tympanograms were obtained bilaterally, consistent with a normal moving eardrums and the likely absence of fluid.    Ipsilateral acoustic reflexes were tested and largely present  at 500Hz, 1000Hz, 2000Hz,  and 4000Hz bilaterally.    AUDIOMETRIC TESTING  Pure tone audiometry conducted via insert headphones from 125 Hz - 8000 Hz with good reliability was consistent with moderate sloping to a profound sensorineural hearing loss     SPEECH RECOGNITION TESTING (SRT)  SRT was in agreement with pure tone averages bilaterally ( Right: 50 dB HL, Left: 50 dB HL).    WORD RECOGNITION SCORE (WRS)  WRS was (96%) in the right ear and (92%) in the left ear using recorded ordered by difficulty NU6 word list.    IMPRESSIONS:  The results of today's assessment after consistent with:  -Normal tympanograms  -Largely acoustic reflexes  -Stable sensorineural hearing loss bilaterally.     The patient was counseled with regard to the findings. A copy of her hearing test was provided to take back to her hearing aid facility     RECOMMENDATIONS:  Treatment Plan:.  Follow up with ENT/PCP as recommended.  Annual hearing assessments as recommended. Follow up sooner if patient feels hearing or symptoms have changed.  Continue full time use of hearing aids   Contact the clinic with questions or concerns at [phone].       Benedicto Renteria, CCC-A, [facility]  Licensed Audiologist  Certified Occupational Hearing Conservationist

## 2024-10-17 ENCOUNTER — PATIENT MESSAGE (OUTPATIENT)
Dept: PRIMARY CARE | Facility: CLINIC | Age: 64
End: 2024-10-17
Payer: MEDICARE

## 2024-10-18 ENCOUNTER — OFFICE VISIT (OUTPATIENT)
Dept: GASTROENTEROLOGY | Facility: CLINIC | Age: 64
End: 2024-10-18
Payer: MEDICARE

## 2024-10-18 VITALS — HEART RATE: 71 BPM | OXYGEN SATURATION: 98 % | HEIGHT: 67 IN | BODY MASS INDEX: 32.49 KG/M2 | WEIGHT: 207 LBS

## 2024-10-18 DIAGNOSIS — K21.9 GASTROESOPHAGEAL REFLUX DISEASE, UNSPECIFIED WHETHER ESOPHAGITIS PRESENT: Primary | ICD-10-CM

## 2024-10-18 DIAGNOSIS — R10.31 RLQ ABDOMINAL PAIN: ICD-10-CM

## 2024-10-18 PROCEDURE — 99203 OFFICE O/P NEW LOW 30 MIN: CPT

## 2024-10-18 PROCEDURE — 3008F BODY MASS INDEX DOCD: CPT

## 2024-10-18 RX ORDER — PANTOPRAZOLE SODIUM 40 MG/1
40 TABLET, DELAYED RELEASE ORAL
Qty: 60 TABLET | Refills: 2 | Status: SHIPPED | OUTPATIENT
Start: 2024-10-18 | End: 2025-10-18

## 2024-10-18 ASSESSMENT — ENCOUNTER SYMPTOMS
BLOOD IN STOOL: 0
TROUBLE SWALLOWING: 0
CHILLS: 0
SHORTNESS OF BREATH: 0
FEVER: 0
NAUSEA: 1
FATIGUE: 0
ABDOMINAL PAIN: 1
VOMITING: 0
CONSTIPATION: 0
ABDOMINAL DISTENTION: 1
APPETITE CHANGE: 0
RECTAL PAIN: 0
DIARRHEA: 0
ANAL BLEEDING: 0
COUGH: 0

## 2024-10-18 NOTE — ASSESSMENT & PLAN NOTE
Likely constipation/IBS.  Consider surgical adhesions  -Daily fiber supplement recommended  -Consider imaging or surgical referral

## 2024-10-18 NOTE — ASSESSMENT & PLAN NOTE
Chronic GERD.  Consider gastritis, esophagitis, duodenitis, PUD  -Start twice daily 40 mg Protonix x 8 weeks then recommended patient update me  -Consider EGD versus dose decrease.

## 2024-10-18 NOTE — PROGRESS NOTES
Subjective     History of Present Illness:   Judith Gr is a 64 y.o. female with PMHx of nicotine dependence, GERD, HTN, HLD who presents to GI clinic for further evaluation of esophageal and stomach discomfort    Today, since having gallbladder removed in 2018 patient has had heartburn.  Avoids trigger foods.  Last week after eating red sauce, she had horrible nausea, belching, heartburn, epigastric/upper abdominal pressure then after eating it has continues.  Takes 20 mg omeprazole prn, which has stopped working.  Has high level of stress.  Also reports intermittent slight RLQ discomfort right at the site of her previous surgery.  Has not been eating as she normally does since caring for her mom.  Moves bowels daily with normal stools.   Denies constipation, diarrhea, melena, hematochezia, dysphagia, unintentional weight loss    Denies ETOH, smoking 1 PPD, denies marijuana  Denies fxh GI cancer or IBD  Abdominal Surgeries: Cholecystectomy, appendectomy, hysterectomy    Last colonoscopy 9/2023 Dr. Hunt for screening: Sigmoid diverticulosis, internal hemorrhoids.  Repeat 10 years  Last EGD       Past Medical History  As per HPI.     Social History  she  reports that she has been smoking cigarettes. She has never used smokeless tobacco. She reports that she does not currently use alcohol. Drug use questions deferred to the physician.     Family History  her family history includes Coronary artery disease in her mother; Dementia in her father; Diabetes in her father; Hyperlipidemia in her mother; carotid artery in her mother.     Review of Systems  Review of Systems   Constitutional:  Negative for appetite change, chills, fatigue and fever.   HENT:  Negative for trouble swallowing.    Respiratory:  Negative for cough and shortness of breath.    Gastrointestinal:  Positive for abdominal distention, abdominal pain (Epigastric) and nausea. Negative for anal bleeding, blood in stool, constipation, diarrhea, rectal  pain and vomiting.       Allergies  Allergies   Allergen Reactions    Other Unknown    Sulfa (Sulfonamide Antibiotics) Hives    Levofloxacin Rash       Medications  Current Outpatient Medications   Medication Instructions    fluticasone (Flonase) 50 mcg/actuation nasal spray 2 sprays, nasal, Daily    ibuprofen (Motrin) capsule oral, 2 times daily    meclizine (ANTIVERT) 25 mg, oral, 3 times daily PRN    metoprolol succinate XL (TOPROL-XL) 50 mg, oral, Daily    ondansetron ODT (Zofran-ODT) 4 mg disintegrating tablet dissolve 1 tablet by mouth three times a day if needed for nausea    pantoprazole (PROTONIX) 40 mg, oral, 2 times daily before meals, Do not crush, chew, or split.    rosuvastatin (CRESTOR) 5 mg, oral, Daily    spironolactone (ALDACTONE) 50 mg, oral, Daily        Objective   Visit Vitals  Pulse 71      Physical Exam  Constitutional:       Appearance: Normal appearance. She is normal weight.   HENT:      Mouth/Throat:      Mouth: Mucous membranes are dry.      Pharynx: Oropharynx is clear.   Cardiovascular:      Rate and Rhythm: Normal rate and regular rhythm.   Pulmonary:      Effort: Pulmonary effort is normal.      Breath sounds: Normal breath sounds. No wheezing or rhonchi.   Abdominal:      General: Abdomen is flat. Bowel sounds are normal. There is distension.      Palpations: Abdomen is soft. There is no hepatomegaly.      Tenderness: There is abdominal tenderness (Epigastric). There is no guarding or rebound. Negative signs include Bradley's sign.      Hernia: No hernia is present.   Musculoskeletal:         General: Normal range of motion.   Skin:     General: Skin is warm and dry.   Neurological:      General: No focal deficit present.      Mental Status: She is alert and oriented to person, place, and time.   Psychiatric:         Mood and Affect: Mood normal.         Behavior: Behavior normal.           Lab Results   Component Value Date    WBC 8.3 08/30/2023    WBC 9.3 08/23/2023    WBC 8.0  01/21/2022    HGB 15.4 08/30/2023    HGB 14.6 08/23/2023    HGB 14.3 01/21/2022    HCT 45.7 08/30/2023    HCT 44.4 08/23/2023    HCT 44.8 01/21/2022     08/30/2023     08/23/2023     01/21/2022     Lab Results   Component Value Date     (L) 08/30/2023     08/23/2023     01/21/2022    K 5.3 08/30/2023    K 4.8 08/23/2023    K 4.7 01/21/2022     08/30/2023     08/23/2023     01/21/2022    CO2 25 08/30/2023    CO2 21 08/23/2023    CO2 23 01/21/2022    BUN 17 08/30/2023    BUN 21 08/23/2023    BUN 14 01/21/2022    CREATININE 0.77 08/30/2023    CREATININE 0.83 08/23/2023    CREATININE 0.78 01/21/2022    CALCIUM 10.6 (H) 08/30/2023    CALCIUM 9.3 08/23/2023    CALCIUM 9.5 01/21/2022    PROT 7.8 08/30/2023    PROT 6.8 08/23/2023    PROT 6.9 01/21/2022    BILITOT 0.4 08/30/2023    BILITOT 0.4 08/23/2023    BILITOT 0.4 01/21/2022    ALKPHOS 85 08/30/2023    ALKPHOS 84 08/23/2023    ALKPHOS 83 01/21/2022    ALT 38 08/30/2023    ALT 44 08/23/2023    ALT 32 01/21/2022    AST 28 08/30/2023    AST 26 08/23/2023    AST 16 01/21/2022    GLUCOSE 98 08/30/2023    GLUCOSE 87 08/23/2023    GLUCOSE 90 01/21/2022           Judith Gr is a 64 y.o. female who presents to GI clinic for GERD with epigastric pain, intermittent RLQ discomfort.    GERD (gastroesophageal reflux disease)  Chronic GERD.  Consider gastritis, esophagitis, duodenitis, PUD  -Start twice daily 40 mg Protonix x 8 weeks then recommended patient update me  -Consider EGD versus dose decrease.      RLQ abdominal pain  Likely constipation/IBS.  Consider surgical adhesions  -Daily fiber supplement recommended  -Consider imaging or surgical referral         Nida Solano, APRN-CNP

## 2024-10-18 NOTE — PATIENT INSTRUCTIONS
Please take 1-2 spoonfuls daily of fiber.  Dissolve in 8 oz liquid or you may use fiber gummies.  This is to help maintain stool consistency.    Please take Pantoprazole 30-60 minutes before a meal twice daily for 8 weeks.  This is to help calm stomach acid.  If this is not helping, notify me and we will schedule an upper endoscopy.      Update me in 2 months

## 2024-11-11 DIAGNOSIS — I10 HYPERTENSION, UNSPECIFIED TYPE: ICD-10-CM

## 2024-11-13 RX ORDER — ROSUVASTATIN CALCIUM 5 MG/1
5 TABLET, COATED ORAL DAILY
Qty: 90 TABLET | Refills: 0 | Status: SHIPPED | OUTPATIENT
Start: 2024-11-13 | End: 2024-11-14 | Stop reason: SDUPTHER

## 2024-12-02 ENCOUNTER — OFFICE VISIT (OUTPATIENT)
Dept: URGENT CARE | Age: 64
End: 2024-12-02
Payer: MEDICARE

## 2024-12-02 VITALS
DIASTOLIC BLOOD PRESSURE: 61 MMHG | RESPIRATION RATE: 18 BRPM | OXYGEN SATURATION: 97 % | TEMPERATURE: 97.3 F | BODY MASS INDEX: 31.32 KG/M2 | SYSTOLIC BLOOD PRESSURE: 95 MMHG | WEIGHT: 200 LBS | HEART RATE: 71 BPM

## 2024-12-02 DIAGNOSIS — R09.81 SINUS CONGESTION: Primary | ICD-10-CM

## 2024-12-02 DIAGNOSIS — U07.1 COVID: ICD-10-CM

## 2024-12-02 LAB
POC RAPID INFLUENZA A: NEGATIVE
POC RAPID INFLUENZA B: NEGATIVE
POC RSV PCR: NOT DETECTED
POC SARS-COV-2 AG BINAX: ABNORMAL

## 2024-12-02 RX ORDER — BENZONATATE 200 MG/1
200 CAPSULE ORAL 3 TIMES DAILY PRN
Qty: 30 CAPSULE | Refills: 0 | Status: SHIPPED | OUTPATIENT
Start: 2024-12-02 | End: 2024-12-09

## 2024-12-02 RX ORDER — DEXAMETHASONE 6 MG/1
6 TABLET ORAL 2 TIMES DAILY
Qty: 10 TABLET | Refills: 0 | Status: SHIPPED | OUTPATIENT
Start: 2024-12-02 | End: 2024-12-07

## 2024-12-02 ASSESSMENT — ENCOUNTER SYMPTOMS: COUGH: 1

## 2024-12-02 NOTE — PROGRESS NOTES
Subjective   Patient ID: Judith Gr is a 64 y.o. female. They present today with a chief complaint of Cough (Chest pain due to coughing, Dizziness, sinus congestion & Blurry eyes for 4 days).    History of Present Illness    Cough      64-year-old female with a history of high cholesterol, hypertension, GERD, and anxiety presents to urgent care for evaluation of cough, congestion, fatigue, and chest pain.  States her symptoms began about 3 or 4 days ago.  No associated shortness of breath.  No fevers or bodyaches.  She denies any ear pain or sore throat.  She does report she had some vertigo a couple of weeks ago but currently is not experiencing any dizziness.  Past Medical History  Allergies as of 12/02/2024 - Reviewed 12/02/2024   Allergen Reaction Noted    Other Unknown 04/10/2023    Sulfa (sulfonamide antibiotics) Hives 03/07/2014    Levofloxacin Rash 04/10/2023       (Not in a hospital admission)       Past Medical History:   Diagnosis Date    Abnormal levels of other serum enzymes 07/30/2015    Elevated liver enzymes    Left lower quadrant pain 11/12/2013    Abdominal pain, LLQ (left lower quadrant)    Other fecal abnormalities 05/15/2014    Loose stools    Other specified anxiety disorders 10/24/2017    Depression with anxiety    Pain in joints of right hand 05/08/2018    Arthralgia of right hand    Pain in unspecified shoulder 05/14/2014    Shoulder pain    Personal history of other diseases of the circulatory system 05/15/2014    History of superficial phlebitis    Personal history of other diseases of the nervous system and sense organs 07/30/2015    History of tinnitus    Personal history of other diseases of the respiratory system 08/05/2013    History of sinusitis    Personal history of other specified conditions 04/09/2014    History of chest pain    Personal history of other specified conditions 05/15/2014    History of epigastric pain    Personal history of urinary (tract) infections 11/12/2013     History of urinary tract infection       Past Surgical History:   Procedure Laterality Date    OTHER SURGICAL HISTORY  01/21/2022    Cholecystectomy    OTHER SURGICAL HISTORY  01/21/2022    Forearm fracture repair    OTHER SURGICAL HISTORY  01/21/2022    Total hysterectomy with removal of both tubes and ovaries    OTHER SURGICAL HISTORY  01/21/2022    Appendectomy        reports that she has been smoking cigarettes. She has never used smokeless tobacco. She reports that she does not currently use alcohol. Drug use questions deferred to the physician.    Review of Systems  Review of Systems   Respiratory:  Positive for cough.    Allergic/Immunologic: Negative for immunocompromised state.   All other systems reviewed and are negative.                                 Objective    Vitals:    12/02/24 1623   BP: 95/61   BP Location: Left arm   Patient Position: Sitting   BP Cuff Size: Adult   Pulse: 71   Resp: 18   Temp: 36.3 °C (97.3 °F)   TempSrc: Oral   SpO2: 97%   Weight: 90.7 kg (200 lb)     No LMP recorded.    Physical Exam  Vitals reviewed.   Constitutional:       Appearance: Normal appearance.   Cardiovascular:      Rate and Rhythm: Normal rate and regular rhythm.      Heart sounds: Normal heart sounds.   Pulmonary:      Effort: Pulmonary effort is normal.      Breath sounds: Normal breath sounds.   Skin:     General: Skin is warm and dry.   Neurological:      Mental Status: She is alert and oriented to person, place, and time.   Psychiatric:         Mood and Affect: Mood normal.         Behavior: Behavior normal.         Procedures    Point of Care Test & Imaging Results from this visit  Results for orders placed or performed in visit on 12/02/24   POCT Covid-19 Rapid Antigen   Result Value Ref Range    POC CHALINO-COV-2 AG Positive test for SARS-CoV-2 (antigen detected) (A) Presumptive negative test for SARS-CoV-2 (no antigen detected)   POCT Influenza A/B manually resulted   Result Value Ref Range    POC Rapid  Influenza A Negative Negative    POC Rapid Influenza B Negative Negative   POCT RSV PCR manually resulted   Result Value Ref Range    POC RSV PCR Not Detected Not Detected      No results found.    Diagnostic study results (if any) were reviewed by Mike Patel PA-C.    Assessment/Plan   Allergies, medications, history, and pertinent labs/EKGs/Imaging reviewed by Mike Patel PA-C.     Medical Decision Making      Orders and Diagnoses  Diagnoses and all orders for this visit:  Sinus congestion  -     POCT Covid-19 Rapid Antigen  -     POCT Influenza A/B manually resulted  -     POCT RSV PCR manually resulted  COVID  -     dexAMETHasone (Decadron) 6 mg tablet; Take 1 tablet (6 mg) by mouth 2 times a day for 5 days.  -     benzonatate (Tessalon) 200 mg capsule; Take 1 capsule (200 mg) by mouth 3 times a day as needed for cough for up to 7 days. Do not crush or chew.      Medical Admin Record      Patient disposition: Home    Electronically signed by Mike Patel PA-C  4:49 PM

## 2024-12-03 DIAGNOSIS — E78.5 HYPERLIPIDEMIA, UNSPECIFIED HYPERLIPIDEMIA TYPE: ICD-10-CM

## 2024-12-06 RX ORDER — SPIRONOLACTONE 50 MG/1
50 TABLET, FILM COATED ORAL DAILY
Qty: 90 TABLET | Refills: 0 | Status: SHIPPED | OUTPATIENT
Start: 2024-12-06

## 2025-01-02 ENCOUNTER — OFFICE VISIT (OUTPATIENT)
Dept: PRIMARY CARE | Facility: CLINIC | Age: 65
End: 2025-01-02
Payer: MEDICARE

## 2025-01-02 VITALS
SYSTOLIC BLOOD PRESSURE: 110 MMHG | TEMPERATURE: 98 F | OXYGEN SATURATION: 98 % | RESPIRATION RATE: 16 BRPM | HEART RATE: 83 BPM | DIASTOLIC BLOOD PRESSURE: 70 MMHG | BODY MASS INDEX: 32.11 KG/M2 | WEIGHT: 205 LBS

## 2025-01-02 DIAGNOSIS — F41.1 GENERALIZED ANXIETY DISORDER: ICD-10-CM

## 2025-01-02 DIAGNOSIS — R51.9 OCULAR HEADACHE: Primary | ICD-10-CM

## 2025-01-02 DIAGNOSIS — Z86.16 HISTORY OF COVID-19: ICD-10-CM

## 2025-01-02 DIAGNOSIS — R06.09 DYSPNEA ON EXERTION: ICD-10-CM

## 2025-01-02 DIAGNOSIS — R10.13 EPIGASTRIC PAIN: ICD-10-CM

## 2025-01-02 DIAGNOSIS — I70.0 CALCIFICATION OF AORTA (CMS-HCC): ICD-10-CM

## 2025-01-02 PROCEDURE — 93010 ELECTROCARDIOGRAM REPORT: CPT | Performed by: FAMILY MEDICINE

## 2025-01-02 PROCEDURE — 1159F MED LIST DOCD IN RCRD: CPT | Performed by: FAMILY MEDICINE

## 2025-01-02 PROCEDURE — 93005 ELECTROCARDIOGRAM TRACING: CPT | Performed by: FAMILY MEDICINE

## 2025-01-02 PROCEDURE — 99214 OFFICE O/P EST MOD 30 MIN: CPT | Mod: 25 | Performed by: FAMILY MEDICINE

## 2025-01-02 PROCEDURE — 1160F RVW MEDS BY RX/DR IN RCRD: CPT | Performed by: FAMILY MEDICINE

## 2025-01-02 PROCEDURE — 99214 OFFICE O/P EST MOD 30 MIN: CPT | Performed by: FAMILY MEDICINE

## 2025-01-02 PROCEDURE — 3078F DIAST BP <80 MM HG: CPT | Performed by: FAMILY MEDICINE

## 2025-01-02 PROCEDURE — 3074F SYST BP LT 130 MM HG: CPT | Performed by: FAMILY MEDICINE

## 2025-01-02 RX ORDER — ESCITALOPRAM OXALATE 10 MG/1
10 TABLET ORAL DAILY
Qty: 90 TABLET | Refills: 0 | Status: SHIPPED | OUTPATIENT
Start: 2025-01-02 | End: 2025-07-01

## 2025-01-02 ASSESSMENT — ENCOUNTER SYMPTOMS
OCCASIONAL FEELINGS OF UNSTEADINESS: 0
DEPRESSION: 0
LOSS OF SENSATION IN FEET: 0

## 2025-01-02 ASSESSMENT — PATIENT HEALTH QUESTIONNAIRE - PHQ9
4. FEELING TIRED OR HAVING LITTLE ENERGY: MORE THAN HALF THE DAYS
7. TROUBLE CONCENTRATING ON THINGS, SUCH AS READING THE NEWSPAPER OR WATCHING TELEVISION: SEVERAL DAYS
SUM OF ALL RESPONSES TO PHQ QUESTIONS 1-9: 11
SUM OF ALL RESPONSES TO PHQ9 QUESTIONS 1 & 2: 0
2. FEELING DOWN, DEPRESSED OR HOPELESS: MORE THAN HALF THE DAYS
10. IF YOU CHECKED OFF ANY PROBLEMS, HOW DIFFICULT HAVE THESE PROBLEMS MADE IT FOR YOU TO DO YOUR WORK, TAKE CARE OF THINGS AT HOME, OR GET ALONG WITH OTHER PEOPLE: SOMEWHAT DIFFICULT
1. LITTLE INTEREST OR PLEASURE IN DOING THINGS: MORE THAN HALF THE DAYS
6. FEELING BAD ABOUT YOURSELF - OR THAT YOU ARE A FAILURE OR HAVE LET YOURSELF OR YOUR FAMILY DOWN: NOT AT ALL
8. MOVING OR SPEAKING SO SLOWLY THAT OTHER PEOPLE COULD HAVE NOTICED. OR THE OPPOSITE, BEING SO FIGETY OR RESTLESS THAT YOU HAVE BEEN MOVING AROUND A LOT MORE THAN USUAL: NOT AT ALL
2. FEELING DOWN, DEPRESSED OR HOPELESS: NOT AT ALL
5. POOR APPETITE OR OVEREATING: MORE THAN HALF THE DAYS
9. THOUGHTS THAT YOU WOULD BE BETTER OFF DEAD, OR OF HURTING YOURSELF: NOT AT ALL
3. TROUBLE FALLING OR STAYING ASLEEP: MORE THAN HALF THE DAYS
1. LITTLE INTEREST OR PLEASURE IN DOING THINGS: NOT AT ALL
SUM OF ALL RESPONSES TO PHQ9 QUESTIONS 1 & 2: 4

## 2025-01-02 ASSESSMENT — COLUMBIA-SUICIDE SEVERITY RATING SCALE - C-SSRS
1. IN THE PAST MONTH, HAVE YOU WISHED YOU WERE DEAD OR WISHED YOU COULD GO TO SLEEP AND NOT WAKE UP?: NO
2. HAVE YOU ACTUALLY HAD ANY THOUGHTS OF KILLING YOURSELF?: NO
6. HAVE YOU EVER DONE ANYTHING, STARTED TO DO ANYTHING, OR PREPARED TO DO ANYTHING TO END YOUR LIFE?: NO

## 2025-01-02 NOTE — PROGRESS NOTES
Outpatient Visit Note    Chief Complaint   Patient presents with    Abdominal Pain       HPI:  Judith Gr is a 64 y.o. female here to establish care with concerns regarding abdominal pain.    Previous PCP is Dr. Luu    She has reflux for which she is on pantoprazole. She cannot tell the difference between feeling emotionally stressed or having GI issues. Gets a pressure feeling in the epigastric area. Had this for 2-4 weeks then saw GI Nida Solano CNP. Was started on pantoprazole as she was having heart burn with this. This is working, took a week to start helping. She wants to do an EGD. Had her gallbladder removed with stones and ever since has had reflux/reflux feelings. Taking PPI twice daily. Not needing the Zofran. Does have some epigastric pain and optical migraine today. Has a history of these for about 5 years. No head pain, just gets some watery swelling around her eye,  gets lightheaded then after  mins it goes away.     Has a whole family history of alzheimer's.     Last colonoscopy 9/2023 Dr. Hunt for screening: Sigmoid diverticulosis, internal hemorrhoids.  Repeat 10 years     Has had SOB and low blood pressure since COVID (had this the day after Thanksgiving). Has felt exhausted with reduced taste. Improving dyspnea with exertion. No crushing chest pressure. Still working in the barn.     PHQ9/GAD7:  Over the past 2 weeks, how often have you been bothered by any of the following problems?  Trouble falling or staying asleep, or sleeping too much: More than half the days  Feeling tired or having little energy: More than half the days  Poor appetite or overeating: More than half the days  Feeling bad about yourself - or that you are a failure or have let yourself or your family down: Not at all  Trouble concentrating on things, such as reading the newspaper or watching television: Several days  Moving or speaking so slowly that other people could have noticed? Or the opposite  - being so fidgety or restless that you have been moving around a lot more than usual.: Not at all  Thoughts that you would be better off dead or hurting yourself in some way: Not at all  Patient Health Questionnaire-9 Score: 11      Patient Active Problem List    Diagnosis Date Noted    Ocular headache 01/02/2025    RLQ abdominal pain 10/18/2024    Anxiety disorder 04/10/2023    Arthralgia of both knees 04/10/2023    Arthralgia of multiple joints 04/10/2023    GERD (gastroesophageal reflux disease) 04/10/2023    Hyperlipidemia 04/10/2023    Hypertension 04/10/2023    Lower back pain 04/10/2023    Other allergic rhinitis 04/10/2023    Sensorineural hearing loss, bilateral 04/10/2023    Tinnitus, subjective, bilateral 04/10/2023        Past Medical History:   Diagnosis Date    Abnormal levels of other serum enzymes 07/30/2015    Elevated liver enzymes    Left lower quadrant pain 11/12/2013    Abdominal pain, LLQ (left lower quadrant)    Other fecal abnormalities 05/15/2014    Loose stools    Other specified anxiety disorders 10/24/2017    Depression with anxiety    Pain in joints of right hand 05/08/2018    Arthralgia of right hand    Pain in unspecified shoulder 05/14/2014    Shoulder pain    Personal history of other diseases of the circulatory system 05/15/2014    History of superficial phlebitis    Personal history of other diseases of the nervous system and sense organs 07/30/2015    History of tinnitus    Personal history of other diseases of the respiratory system 08/05/2013    History of sinusitis    Personal history of other specified conditions 04/09/2014    History of chest pain    Personal history of other specified conditions 05/15/2014    History of epigastric pain    Personal history of urinary (tract) infections 11/12/2013    History of urinary tract infection        Current Medications  Current Outpatient Medications   Medication Instructions    dexAMETHasone (DECADRON) 6 mg, oral, 2 times daily     escitalopram (LEXAPRO) 10 mg, oral, Daily, Take 1/2 tablet once daily at night for the first week then go to 1 tab daily thereafter    fluticasone (Flonase) 50 mcg/actuation nasal spray 2 sprays, Daily    ibuprofen (Motrin) capsule 2 times daily    meclizine (ANTIVERT) 25 mg, 3 times daily PRN    metoprolol succinate XL (TOPROL-XL) 50 mg, oral, Daily    ondansetron ODT (Zofran-ODT) 4 mg disintegrating tablet dissolve 1 tablet by mouth three times a day if needed for nausea    pantoprazole (PROTONIX) 40 mg, oral, 2 times daily before meals, Do not crush, chew, or split.    rosuvastatin (CRESTOR) 5 mg, oral, Daily    spironolactone (ALDACTONE) 50 mg, oral, Daily        Allergies  Allergies   Allergen Reactions    Other Unknown    Sulfa (Sulfonamide Antibiotics) Hives    Levofloxacin Rash        Past Surgical History:   Procedure Laterality Date    OTHER SURGICAL HISTORY  01/21/2022    Cholecystectomy    OTHER SURGICAL HISTORY  01/21/2022    Forearm fracture repair    OTHER SURGICAL HISTORY  01/21/2022    Total hysterectomy with removal of both tubes and ovaries    OTHER SURGICAL HISTORY  01/21/2022    Appendectomy     Family History   Problem Relation Name Age of Onset    Coronary artery disease Mother      Hyperlipidemia Mother      Other (carotid artery) Mother      Dementia Father      Diabetes Father       Social History     Tobacco Use    Smoking status: Every Day     Current packs/day: 1.00     Types: Cigarettes    Smokeless tobacco: Never   Vaping Use    Vaping status: Never Used   Substance Use Topics    Alcohol use: Not Currently    Drug use: Defer     Tobacco Use: High Risk (1/2/2025)    Patient History     Smoking Tobacco Use: Every Day     Smokeless Tobacco Use: Never     Passive Exposure: Not on file        ROS  All pertinent positive symptoms are included in the history of present illness.  All other systems have been reviewed and are negative and noncontributory to this patient's current  ailments.    VITAL SIGNS  Vitals:    01/02/25 1527   BP: 110/70   Pulse: 83   Resp: 16   Temp: 36.7 °C (98 °F)   SpO2: 98%     Vitals:    01/02/25 1527   Weight: 93 kg (205 lb)      Body mass index is 32.11 kg/m².     PHYSICAL EXAM  GENERAL APPEARANCE: well nourished, well developed, looks like stated age, in no acute distress, not ill or tired appearing, conversing well.   HEENT: no trauma, normocephalic.   NECK: no nodes, supple without rigidity, no neck mass was observed,  no thyromegaly.   HEART: regular rate and rhythm, S1 and S2 heard with no murmurs or skipped beats. No carotid bruits.  LUNGS: clear to auscultation bilaterally with no wheezes, crackles or rales.   ABDOMEN: no organomegaly, soft, nontender, nondistended, normal bowel sounds, no guarding/rebound/rigidity.   EXTREMITIES: moving all extremities equally with no edema or deformities.   SKIN: normal skin color and pigmentation, normal skin turgor without rash, lesions, or nodules visualized.   NEUROLOGIC EXAM: CN II-XII grossly intact, normal gait, normal balance.   PSYCH: mood and affect appropriate; alert and oriented to time, place, person; no difficulty with speech or language.     Counseling:       Medication education:           Education:  The patient is counseled regarding potential side-effects of all new medications          Understanding:  Patient expressed understanding of information conveyed today          Adherence:  No barriers to adherence identified     Assessment/Plan   Problem List Items Addressed This Visit             ICD-10-CM    Anxiety disorder F41.9    Relevant Medications    escitalopram (Lexapro) 10 mg tablet    Ocular headache - Primary R51.9     Other Visit Diagnoses         Codes    Dyspnea on exertion     R06.09    Relevant Orders    ECG 12 lead (Clinic Performed)    Referral to Cardiology    History of COVID-19     Z86.16    Relevant Orders    ECG 12 lead (Clinic Performed)    Referral to Cardiology    Calcification  of aorta (CMS-HCC)     I70.0    Relevant Orders    US aorta iliac vessels doppler complete    Referral to Cardiology    Epigastric pain     R10.13    Relevant Orders    US aorta iliac vessels doppler complete    Referral to Cardiology            Additional Visit Plans:  Plan to see ophthalmology for your eyes.     With your family stress and everything I feel it may be worth trying something to help with anxiety and stress. Maybe this will ease up tension on your stomach and head. I recommend Lexapro once daily. Follow up with me in 4-6 weeks.     Please call [facility] at [phone] to make an appointment with one of their counselors. Please notify my office if they do not accept your insurance. See if you can do counseling with Aaliyah De Souza in [place].     Stay on Pantoprazole and pursue the EGD.     Plan for ultrasound os you aorta and EKG today. I think your symptoms are typical for recovery from COVID but lets make sure there is no physical changes of the heart. If the shortness of breath or blood pressure does not improve further from this point onwards - lets see cardiology.       No care team member to display    Antonio Pablo,    01/02/25   4:26 PM

## 2025-01-02 NOTE — PATIENT INSTRUCTIONS
Problem List Items Addressed This Visit             ICD-10-CM    Anxiety disorder F41.9    Relevant Medications    escitalopram (Lexapro) 10 mg tablet    Ocular headache - Primary R51.9     Other Visit Diagnoses         Codes    Dyspnea on exertion     R06.09    Relevant Orders    ECG 12 lead (Clinic Performed)    Referral to Cardiology    History of COVID-19     Z86.16    Relevant Orders    ECG 12 lead (Clinic Performed)    Referral to Cardiology    Calcification of aorta (CMS-HCC)     I70.0    Relevant Orders    US aorta iliac vessels doppler complete    Referral to Cardiology    Epigastric pain     R10.13    Relevant Orders    US aorta iliac vessels doppler complete    Referral to Cardiology            Additional Visit Plans:  Plan to see ophthalmology for your eyes.     With your family stress and everything I feel it may be worth trying something to help with anxiety and stress. Maybe this will ease up tension on your stomach and head. I recommend Lexapro once daily. Follow up with me in 4-6 weeks.     Please call [email or web address] at [phone] to make an appointment with one of their counselors. Please notify my office if they do not accept your insurance. See if you can do counseling with Aaliyah De Souza in [place].     Stay on Pantoprazole and pursue the EGD.     Plan for ultrasound os you aorta and EKG today. I think your symptoms are typical for recovery from COVID but lets make sure there is no physical changes of the heart. If the shortness of breath or blood pressure does not improve further from this point onwards - lets see cardiology.       No care team member to display    Antonio Pablo,    01/02/25   4:26 PM

## 2025-01-06 DIAGNOSIS — K21.9 GASTROESOPHAGEAL REFLUX DISEASE, UNSPECIFIED WHETHER ESOPHAGITIS PRESENT: Primary | ICD-10-CM

## 2025-01-08 NOTE — PROGRESS NOTES
Judith Gr is a 64 y.o. female on day 0 of admission presenting with No Principal Problem: There is no principal problem currently on the Problem List. Please update the Problem List and refresh..    Subjective   ***       Objective     Physical Exam    Last Recorded Vitals  There were no vitals taken for this visit.  Intake/Output last 3 Shifts:  No intake/output data recorded.    Relevant Results  {If you would like to pull in Medications, type .meds     If you would like to pull in Lab results for the last 24 hours, type .[id number]    If you would like to pull in Imaging results, type .[id number] :99}    {Link to Stroke Scoring tools - Link :99}                        Assessment/Plan   Assessment & Plan    ***     {This patient does not have an ACP note on file for this encounter, please fill one out - Advance Care Planning Activity :99}      Leah Young, RT

## 2025-01-09 ENCOUNTER — HOSPITAL ENCOUNTER (OUTPATIENT)
Dept: VASCULAR MEDICINE | Facility: HOSPITAL | Age: 65
Discharge: HOME | End: 2025-01-09
Payer: MEDICARE

## 2025-01-09 DIAGNOSIS — I70.0 CALCIFICATION OF AORTA (CMS-HCC): ICD-10-CM

## 2025-01-09 DIAGNOSIS — R10.13 EPIGASTRIC PAIN: ICD-10-CM

## 2025-01-09 PROCEDURE — 93978 VASCULAR STUDY: CPT

## 2025-01-09 PROCEDURE — 93978 VASCULAR STUDY: CPT | Performed by: SURGERY

## 2025-01-11 DIAGNOSIS — I10 PRIMARY HYPERTENSION: ICD-10-CM

## 2025-01-13 RX ORDER — METOPROLOL SUCCINATE 50 MG/1
50 TABLET, EXTENDED RELEASE ORAL DAILY
Qty: 90 TABLET | Refills: 1 | Status: SHIPPED | OUTPATIENT
Start: 2025-01-13

## 2025-01-27 DIAGNOSIS — K21.9 GASTROESOPHAGEAL REFLUX DISEASE, UNSPECIFIED WHETHER ESOPHAGITIS PRESENT: ICD-10-CM

## 2025-01-27 RX ORDER — PANTOPRAZOLE SODIUM 40 MG/1
40 TABLET, DELAYED RELEASE ORAL
Qty: 90 TABLET | Refills: 2 | OUTPATIENT
Start: 2025-01-27 | End: 2025-04-27

## 2025-02-03 ENCOUNTER — APPOINTMENT (OUTPATIENT)
Dept: GASTROENTEROLOGY | Facility: EXTERNAL LOCATION | Age: 65
End: 2025-02-03
Payer: MEDICARE

## 2025-02-03 DIAGNOSIS — K21.9 GASTROESOPHAGEAL REFLUX DISEASE, UNSPECIFIED WHETHER ESOPHAGITIS PRESENT: ICD-10-CM

## 2025-02-03 DIAGNOSIS — K31.89 OTHER DISEASES OF STOMACH AND DUODENUM: Primary | ICD-10-CM

## 2025-02-03 PROCEDURE — 88305 TISSUE EXAM BY PATHOLOGIST: CPT

## 2025-02-03 PROCEDURE — 88305 TISSUE EXAM BY PATHOLOGIST: CPT | Performed by: STUDENT IN AN ORGANIZED HEALTH CARE EDUCATION/TRAINING PROGRAM

## 2025-02-03 PROCEDURE — 43239 EGD BIOPSY SINGLE/MULTIPLE: CPT | Performed by: INTERNAL MEDICINE

## 2025-02-04 ENCOUNTER — LAB REQUISITION (OUTPATIENT)
Dept: LAB | Facility: HOSPITAL | Age: 65
End: 2025-02-04
Payer: MEDICARE

## 2025-02-04 DIAGNOSIS — K21.9 GASTRO-ESOPHAGEAL REFLUX DISEASE WITHOUT ESOPHAGITIS: ICD-10-CM

## 2025-02-07 LAB
LABORATORY COMMENT REPORT: NORMAL
PATH REPORT.FINAL DX SPEC: NORMAL
PATH REPORT.GROSS SPEC: NORMAL
PATH REPORT.RELEVANT HX SPEC: NORMAL
PATH REPORT.TOTAL CANCER: NORMAL

## 2025-02-07 NOTE — PROGRESS NOTES
Outpatient Visit Note    Chief Complaint   Patient presents with    Follow-up     GERD       HPI:  Judith Gr is a 65 y.o. female here to follow-up on a few of her conditions as detailed below    She went to see cardiology to weigh in on the ultrasound of her aorta.  We are also watching her blood pressure numbers.  She says blood pressure is high today, she just came from having her stress test done. Developed chest pain 1.5 minutes into the stress test. They stopped the test. She then got very tearful. Was also tearful yesterday. Will be getting carotid ultrasound next week. Sees Dr. Cortez on the 25th. He wanted these tests to weigh in on anything for her.     She was having a lot of reflux and GI issues for which she has been seeing the GI doctor and since her last visit had an EGD. Says EGD was ok. No longer on  the PPI. Uses Tums for any minor reflux. Her stomach issues have calmed down.     When we talked last time it seemed like she was going through a lot with family stressors and having anxiety.  I recommended she try Lexapro daily to see if this can help make things more manageable and to even look into counseling. [place] she did not want to do anything on the whole tab of Lexapro. She went down to half a tab and got some energy back.     Tried Paxil before - was ok. On it for 2 years.     PHQ9/GAD7:         Patient Active Problem List    Diagnosis Date Noted    Ocular headache 01/02/2025    RLQ abdominal pain 10/18/2024    Anxiety disorder 04/10/2023    Arthralgia of both knees 04/10/2023    Arthralgia of multiple joints 04/10/2023    GERD (gastroesophageal reflux disease) 04/10/2023    Hyperlipidemia 04/10/2023    Hypertension 04/10/2023    Lower back pain 04/10/2023    Other allergic rhinitis 04/10/2023    Sensorineural hearing loss, bilateral 04/10/2023    Tinnitus, subjective, bilateral 04/10/2023        Past Medical History:   Diagnosis Date    Abnormal levels of other serum enzymes  07/30/2015    Elevated liver enzymes    Left lower quadrant pain 11/12/2013    Abdominal pain, LLQ (left lower quadrant)    Other fecal abnormalities 05/15/2014    Loose stools    Other specified anxiety disorders 10/24/2017    Depression with anxiety    Pain in joints of right hand 05/08/2018    Arthralgia of right hand    Pain in unspecified shoulder 05/14/2014    Shoulder pain    Personal history of other diseases of the circulatory system 05/15/2014    History of superficial phlebitis    Personal history of other diseases of the nervous system and sense organs 07/30/2015    History of tinnitus    Personal history of other diseases of the respiratory system 08/05/2013    History of sinusitis    Personal history of other specified conditions 04/09/2014    History of chest pain    Personal history of other specified conditions 05/15/2014    History of epigastric pain    Personal history of urinary (tract) infections 11/12/2013    History of urinary tract infection        Current Medications  Current Outpatient Medications   Medication Instructions    dexAMETHasone (DECADRON) 6 mg, oral, 2 times daily    fluticasone (Flonase) 50 mcg/actuation nasal spray 2 sprays, Daily    ibuprofen (Motrin) capsule 2 times daily    meclizine (ANTIVERT) 25 mg, 3 times daily PRN    metoprolol succinate XL (TOPROL-XL) 50 mg, oral, Daily    ondansetron ODT (Zofran-ODT) 4 mg disintegrating tablet dissolve 1 tablet by mouth three times a day if needed for nausea    PARoxetine (Paxil) 10 mg tablet Take 1 tab once daily for 1 week then increase to 2 tabs daily thereafter.    rosuvastatin (CRESTOR) 5 mg, oral, Daily    spironolactone (ALDACTONE) 50 mg, oral, Daily        Allergies  Allergies   Allergen Reactions    Other Unknown    Sulfa (Sulfonamide Antibiotics) Hives    Levofloxacin Rash        Past Surgical History:   Procedure Laterality Date    OTHER SURGICAL HISTORY  01/21/2022    Cholecystectomy    OTHER SURGICAL HISTORY  01/21/2022     Forearm fracture repair    OTHER SURGICAL HISTORY  01/21/2022    Total hysterectomy with removal of both tubes and ovaries    OTHER SURGICAL HISTORY  01/21/2022    Appendectomy     Family History   Problem Relation Name Age of Onset    Coronary artery disease Mother      Hyperlipidemia Mother      Other (carotid artery) Mother      Dementia Father      Diabetes Father       Social History     Tobacco Use    Smoking status: Every Day     Current packs/day: 1.00     Average packs/day: 1 pack/day for 40.0 years (40.0 ttl pk-yrs)     Types: Cigarettes    Smokeless tobacco: Never   Vaping Use    Vaping status: Never Used   Substance Use Topics    Alcohol use: Not Currently    Drug use: Not Currently     Tobacco Use: High Risk (2/13/2025)    Patient History     Smoking Tobacco Use: Every Day     Smokeless Tobacco Use: Never     Passive Exposure: Not on file        ROS  All pertinent positive symptoms are included in the history of present illness.  All other systems have been reviewed and are negative and noncontributory to this patient's current ailments.    VITAL SIGNS  Vitals:    02/13/25 1312   BP: 166/90   Pulse: 84   Resp: 20   Temp: 36.7 °C (98 °F)   SpO2: 97%     Vitals:    02/13/25 1312   Weight: 93.2 kg (205 lb 8 oz)      Body mass index is 32.19 kg/m².     PHYSICAL EXAM  GENERAL APPEARANCE: well nourished, well developed, looks like stated age, in no acute distress, not ill or tired appearing, conversing well.   HEENT: no trauma, normocephalic.   NECK: no nodes, supple without rigidity, no neck mass was observed,  no thyromegaly.   HEART: regular rate and rhythm, S1 and S2 heard with no murmurs or skipped beats. No carotid bruits.  LUNGS: clear to auscultation bilaterally with no wheezes, crackles or rales.   EXTREMITIES: moving all extremities equally with no edema or deformities.   SKIN: normal skin color and pigmentation, normal skin turgor without rash, lesions, or nodules visualized.   NEUROLOGIC EXAM:  CN II-XII grossly intact, normal gait, normal balance.   PSYCH: depressed mood, affect appropriate; alert and oriented to time, place, person; no difficulty with speech or language.     Counseling:       Medication education:           Education:  The patient is counseled regarding potential side-effects of all new medications          Understanding:  Patient expressed understanding of information conveyed today          Adherence:  No barriers to adherence identified     Assessment/Plan   Problem List Items Addressed This Visit             ICD-10-CM    Anxiety disorder F41.9    Relevant Medications    PARoxetine (Paxil) 10 mg tablet     Other Visit Diagnoses         Codes    Anxiety and depression    -  Primary F41.9, F32.A    Relevant Medications    PARoxetine (Paxil) 10 mg tablet            Additional Visit Plans:  Stop Lexapro now and start Paroxetine. Take 1 tab once daily for 1 week then increase to 2 tabs once daily. Follow up in 4-8 weeks to see how you are doing.     Plan to continue cardiology workup. Watch those blood pressures for us. If you keep running above 150 let Dr. Cortez know.       Patient Care Team:  Antonio Pablo DO as PCP - General (Family Medicine)  Alan Cortez MD as Consulting Physician (Cardiology)    Antonio Pablo DO   02/13/25   1:36 PM

## 2025-02-07 NOTE — RESULT ENCOUNTER NOTE
The biopsies performed in your stomach did not show any evidence of H. pylori bacterial infection.  The recommendation is to follow-up in the office as needed.      Jud Hunt MD

## 2025-02-10 ENCOUNTER — APPOINTMENT (OUTPATIENT)
Dept: PRIMARY CARE | Facility: CLINIC | Age: 65
End: 2025-02-10
Payer: MEDICARE

## 2025-02-13 ENCOUNTER — OFFICE VISIT (OUTPATIENT)
Dept: PRIMARY CARE | Facility: CLINIC | Age: 65
End: 2025-02-13
Payer: MEDICARE

## 2025-02-13 VITALS
RESPIRATION RATE: 20 BRPM | TEMPERATURE: 98 F | SYSTOLIC BLOOD PRESSURE: 166 MMHG | HEART RATE: 84 BPM | WEIGHT: 205.5 LBS | DIASTOLIC BLOOD PRESSURE: 90 MMHG | OXYGEN SATURATION: 97 % | BODY MASS INDEX: 32.19 KG/M2

## 2025-02-13 DIAGNOSIS — F41.1 GENERALIZED ANXIETY DISORDER: ICD-10-CM

## 2025-02-13 DIAGNOSIS — F41.9 ANXIETY AND DEPRESSION: ICD-10-CM

## 2025-02-13 DIAGNOSIS — F32.A ANXIETY AND DEPRESSION: Primary | ICD-10-CM

## 2025-02-13 DIAGNOSIS — F41.9 ANXIETY AND DEPRESSION: Primary | ICD-10-CM

## 2025-02-13 DIAGNOSIS — F32.A ANXIETY AND DEPRESSION: ICD-10-CM

## 2025-02-13 PROCEDURE — 3077F SYST BP >= 140 MM HG: CPT | Performed by: FAMILY MEDICINE

## 2025-02-13 PROCEDURE — 1159F MED LIST DOCD IN RCRD: CPT | Performed by: FAMILY MEDICINE

## 2025-02-13 PROCEDURE — 3080F DIAST BP >= 90 MM HG: CPT | Performed by: FAMILY MEDICINE

## 2025-02-13 PROCEDURE — 1125F AMNT PAIN NOTED PAIN PRSNT: CPT | Performed by: FAMILY MEDICINE

## 2025-02-13 PROCEDURE — 99214 OFFICE O/P EST MOD 30 MIN: CPT | Performed by: FAMILY MEDICINE

## 2025-02-13 PROCEDURE — 1160F RVW MEDS BY RX/DR IN RCRD: CPT | Performed by: FAMILY MEDICINE

## 2025-02-13 RX ORDER — PAROXETINE 10 MG/1
TABLET, FILM COATED ORAL
Qty: 60 TABLET | Refills: 1 | Status: SHIPPED | OUTPATIENT
Start: 2025-02-13 | End: 2025-02-20 | Stop reason: SDUPTHER

## 2025-02-13 ASSESSMENT — PATIENT HEALTH QUESTIONNAIRE - PHQ9
2. FEELING DOWN, DEPRESSED OR HOPELESS: NOT AT ALL
SUM OF ALL RESPONSES TO PHQ9 QUESTIONS 1 & 2: 0
1. LITTLE INTEREST OR PLEASURE IN DOING THINGS: NOT AT ALL

## 2025-02-13 ASSESSMENT — ENCOUNTER SYMPTOMS
OCCASIONAL FEELINGS OF UNSTEADINESS: 0
DEPRESSION: 0
LOSS OF SENSATION IN FEET: 0

## 2025-02-13 ASSESSMENT — COLUMBIA-SUICIDE SEVERITY RATING SCALE - C-SSRS
2. HAVE YOU ACTUALLY HAD ANY THOUGHTS OF KILLING YOURSELF?: NO
6. HAVE YOU EVER DONE ANYTHING, STARTED TO DO ANYTHING, OR PREPARED TO DO ANYTHING TO END YOUR LIFE?: NO
1. IN THE PAST MONTH, HAVE YOU WISHED YOU WERE DEAD OR WISHED YOU COULD GO TO SLEEP AND NOT WAKE UP?: NO

## 2025-02-13 ASSESSMENT — PAIN SCALES - GENERAL: PAINLEVEL_OUTOF10: 3

## 2025-02-13 NOTE — PATIENT INSTRUCTIONS
Problem List Items Addressed This Visit             ICD-10-CM    Anxiety disorder F41.9    Relevant Medications    PARoxetine (Paxil) 10 mg tablet     Other Visit Diagnoses         Codes    Anxiety and depression    -  Primary F41.9, F32.A    Relevant Medications    PARoxetine (Paxil) 10 mg tablet            Additional Visit Plans:  Stop Lexapro now and start Paroxetine. Take 1 tab once daily for 1 week then increase to 2 tabs once daily. Follow up in 4-8 weeks to see how you are doing.     Plan to continue cardiology workup. Watch those blood pressures for us. If you keep running above 150 let Dr. Cortez know.       Patient Care Team:  Antonio Pablo DO as PCP - General (Family Medicine)  Alan Cortez MD as Consulting Physician (Cardiology)    Antonio Pablo DO   02/13/25   1:36 PM

## 2025-02-17 RX ORDER — SERTRALINE HYDROCHLORIDE 25 MG/1
TABLET, FILM COATED ORAL
Refills: 0 | OUTPATIENT
Start: 2025-02-17

## 2025-02-18 ENCOUNTER — TELEPHONE (OUTPATIENT)
Dept: PRIMARY CARE | Facility: CLINIC | Age: 65
End: 2025-02-18
Payer: MEDICARE

## 2025-02-18 DIAGNOSIS — F41.9 ANXIETY AND DEPRESSION: ICD-10-CM

## 2025-02-18 DIAGNOSIS — F32.A ANXIETY AND DEPRESSION: ICD-10-CM

## 2025-02-18 DIAGNOSIS — F41.1 GENERALIZED ANXIETY DISORDER: ICD-10-CM

## 2025-02-18 NOTE — TELEPHONE ENCOUNTER
PT THINKS SHE HAS A SINUS INFECTION, SWOLLEN EYES, HURTS BEHIND EYES, HEADACHES, STUFFY NOSE, NO FEVER, NO CHEST PAINS AND NEGATIVE FOR COVID. STARTED ABOUT 1-2 DAYS AGO    [facility] INSURANCE DENIED PRESCRIPTION FOR PLAN OF INCREASE OF DAILY DOSAGE FOR PAXIL, WANTS TO KNOW IF DR COLEMAN COULD MODIFY PRESCRIPTION.        PARoxetine (Paxil) 10 mg tablet Take 1 tab once daily for 1 week then increase to 2 tabs daily thereafter.   Number of times this order has been changed since signin      [id number] - [place] [id number]  Phone: [phone]  Fax: [phone]

## 2025-02-20 ENCOUNTER — APPOINTMENT (OUTPATIENT)
Dept: PRIMARY CARE | Facility: CLINIC | Age: 65
End: 2025-02-20
Payer: MEDICARE

## 2025-02-20 RX ORDER — PAROXETINE HYDROCHLORIDE 20 MG/1
TABLET, FILM COATED ORAL
Qty: 90 TABLET | Refills: 0 | Status: SHIPPED | OUTPATIENT
Start: 2025-02-20

## 2025-02-20 NOTE — TELEPHONE ENCOUNTER
Sending a new prescription for the Paxil.  Please see if patient can do acute sick visit virtually with Dr. Steven pineda or DIANNE Guerra as I do not have any openings for the sinus symptoms.

## 2025-02-24 ENCOUNTER — ANCILLARY PROCEDURE (OUTPATIENT)
Dept: URGENT CARE | Age: 65
End: 2025-02-24
Payer: MEDICARE

## 2025-02-24 ENCOUNTER — OFFICE VISIT (OUTPATIENT)
Dept: URGENT CARE | Age: 65
End: 2025-02-24
Payer: MEDICARE

## 2025-02-24 VITALS
DIASTOLIC BLOOD PRESSURE: 74 MMHG | HEART RATE: 61 BPM | WEIGHT: 200 LBS | OXYGEN SATURATION: 95 % | SYSTOLIC BLOOD PRESSURE: 108 MMHG | BODY MASS INDEX: 31.32 KG/M2 | TEMPERATURE: 98.7 F | RESPIRATION RATE: 23 BRPM

## 2025-02-24 DIAGNOSIS — R05.1 ACUTE COUGH: ICD-10-CM

## 2025-02-24 DIAGNOSIS — Z72.0 TOBACCO USE: ICD-10-CM

## 2025-02-24 DIAGNOSIS — R06.2 WHEEZING: ICD-10-CM

## 2025-02-24 DIAGNOSIS — J01.20 ACUTE ETHMOIDAL SINUSITIS, RECURRENCE NOT SPECIFIED: Primary | ICD-10-CM

## 2025-02-24 LAB
POC RAPID INFLUENZA A: NEGATIVE
POC RAPID INFLUENZA B: NEGATIVE

## 2025-02-24 PROCEDURE — 87804 INFLUENZA ASSAY W/OPTIC: CPT

## 2025-02-24 PROCEDURE — 3078F DIAST BP <80 MM HG: CPT

## 2025-02-24 PROCEDURE — 99213 OFFICE O/P EST LOW 20 MIN: CPT

## 2025-02-24 PROCEDURE — 1159F MED LIST DOCD IN RCRD: CPT

## 2025-02-24 PROCEDURE — 71046 X-RAY EXAM CHEST 2 VIEWS: CPT

## 2025-02-24 PROCEDURE — 3074F SYST BP LT 130 MM HG: CPT

## 2025-02-24 RX ORDER — FLUTICASONE PROPIONATE 50 MCG
1 SPRAY, SUSPENSION (ML) NASAL DAILY
Qty: 16 G | Refills: 0 | Status: SHIPPED | OUTPATIENT
Start: 2025-02-24 | End: 2025-03-26

## 2025-02-24 RX ORDER — CETIRIZINE HYDROCHLORIDE 10 MG/1
10 TABLET ORAL DAILY
Qty: 30 TABLET | Refills: 0 | Status: SHIPPED | OUTPATIENT
Start: 2025-02-24 | End: 2025-03-26

## 2025-02-24 RX ORDER — PREDNISONE 20 MG/1
40 TABLET ORAL DAILY
Qty: 10 TABLET | Refills: 0 | Status: SHIPPED | OUTPATIENT
Start: 2025-02-24 | End: 2025-03-01

## 2025-02-24 RX ORDER — BENZONATATE 200 MG/1
200 CAPSULE ORAL 3 TIMES DAILY PRN
Qty: 21 CAPSULE | Refills: 0 | Status: SHIPPED | OUTPATIENT
Start: 2025-02-24 | End: 2025-03-03

## 2025-02-24 RX ORDER — DOXYCYCLINE HYCLATE 100 MG
100 TABLET ORAL 2 TIMES DAILY
Qty: 14 TABLET | Refills: 0 | Status: SHIPPED | OUTPATIENT
Start: 2025-02-24 | End: 2025-03-03

## 2025-02-24 RX ORDER — ALBUTEROL SULFATE 90 UG/1
2 INHALANT RESPIRATORY (INHALATION) EVERY 6 HOURS PRN
Qty: 18 G | Refills: 0 | Status: SHIPPED | OUTPATIENT
Start: 2025-02-24 | End: 2026-02-24

## 2025-02-24 NOTE — PROGRESS NOTES
Subjective   Patient ID: Judith Gr is a 65 y.o. female. They present today with a chief complaint of Cough (Pt c/o cough, sinus pain, nausea, scratchy throat for 1 week, neg covid test).    History of Present Illness  See mdm           Past Medical History  Allergies as of 02/24/2025 - Reviewed 02/24/2025   Allergen Reaction Noted    Penicillins Swelling 10/21/2023    Other Unknown 04/10/2023    Sulfa (sulfonamide antibiotics) Hives 03/07/2014    Levofloxacin Rash 04/10/2023       (Not in a hospital admission)       Past Medical History:   Diagnosis Date    Abnormal levels of other serum enzymes 07/30/2015    Elevated liver enzymes    Left lower quadrant pain 11/12/2013    Abdominal pain, LLQ (left lower quadrant)    Other fecal abnormalities 05/15/2014    Loose stools    Other specified anxiety disorders 10/24/2017    Depression with anxiety    Pain in joints of right hand 05/08/2018    Arthralgia of right hand    Pain in unspecified shoulder 05/14/2014    Shoulder pain    Personal history of other diseases of the circulatory system 05/15/2014    History of superficial phlebitis    Personal history of other diseases of the nervous system and sense organs 07/30/2015    History of tinnitus    Personal history of other diseases of the respiratory system 08/05/2013    History of sinusitis    Personal history of other specified conditions 04/09/2014    History of chest pain    Personal history of other specified conditions 05/15/2014    History of epigastric pain    Personal history of urinary (tract) infections 11/12/2013    History of urinary tract infection       Past Surgical History:   Procedure Laterality Date    OTHER SURGICAL HISTORY  01/21/2022    Cholecystectomy    OTHER SURGICAL HISTORY  01/21/2022    Forearm fracture repair    OTHER SURGICAL HISTORY  01/21/2022    Total hysterectomy with removal of both tubes and ovaries    OTHER SURGICAL HISTORY  01/21/2022    Appendectomy        reports that she has  been smoking cigarettes. She has a 40 pack-year smoking history. She has never used smokeless tobacco. She reports that she does not currently use alcohol. She reports that she does not currently use drugs.    Review of Systems  Review of Systems   All other systems reviewed and are negative.                                 Objective    Vitals:    02/24/25 1402   BP: 108/74   BP Location: Left arm   Patient Position: Sitting   BP Cuff Size: Large adult   Pulse: 61   Resp: 23   Temp: 37.1 °C (98.7 °F)   TempSrc: Oral   SpO2: 95%   Weight: 90.7 kg (200 lb)     No LMP recorded. Patient is postmenopausal.    Physical Exam  Vitals and nursing note reviewed.   Constitutional:       General: She is not in acute distress.     Appearance: Normal appearance. She is normal weight. She is not ill-appearing, toxic-appearing or diaphoretic.   HENT:      Head: Normocephalic and atraumatic.      Right Ear: Ear canal and external ear normal. There is no impacted cerumen.      Left Ear: Ear canal and external ear normal. There is no impacted cerumen.      Ears:      Comments: Bilateral TM serous effusion and slight bulging without erythema.  EAC and mastoid normal     Nose: Congestion present.      Comments: Generalized mild sinus tenderness     Mouth/Throat:      Mouth: Mucous membranes are moist.      Pharynx: No oropharyngeal exudate or posterior oropharyngeal erythema.      Comments: Oropharynx is widely patent.  Mucous membranes are moist.  No erythema, exudate, edema or asymmetry of posterior pharynx or tonsils.  Uvula is midline and without edema.  No muffled voice or trismus.   Eyes:      General: No scleral icterus.        Right eye: No discharge.         Left eye: No discharge.      Extraocular Movements: Extraocular movements intact.      Conjunctiva/sclera: Conjunctivae normal.      Pupils: Pupils are equal, round, and reactive to light.   Neck:      Comments: No JVD   Cardiovascular:      Rate and Rhythm: Normal rate and  regular rhythm.      Pulses: Normal pulses.      Heart sounds: Normal heart sounds. No murmur heard.     No friction rub. No gallop.   Pulmonary:      Effort: Pulmonary effort is normal. No respiratory distress.      Breath sounds: Wheezing present. No rhonchi or rales.      Comments: Left upper discrete wheeze.  No rales or rhonchi.  No increased WOB or respiratory distress  Abdominal:      General: Abdomen is flat.   Musculoskeletal:         General: Normal range of motion.      Cervical back: Normal range of motion and neck supple. No rigidity or tenderness.      Right lower leg: No edema.      Left lower leg: No edema.      Comments: No calf or popliteal tenderness.  No lower extremity edema. LE WWP, sensation intact capillary fill time less than 2 seconds    Lymphadenopathy:      Cervical: No cervical adenopathy.   Skin:     General: Skin is warm and dry.      Capillary Refill: Capillary refill takes less than 2 seconds.      Coloration: Skin is not jaundiced or pale.      Findings: No rash.   Neurological:      General: No focal deficit present.      Mental Status: She is alert.      Gait: Gait normal.   Psychiatric:         Mood and Affect: Mood normal.         Behavior: Behavior normal.         Procedures    Point of Care Test & Imaging Results from this visit  Results for orders placed or performed in visit on 02/24/25   POCT Influenza A/B manually resulted   Result Value Ref Range    POC Rapid Influenza A Negative Negative    POC Rapid Influenza B Negative Negative      XR chest 2 views    Result Date: 2/24/2025  Interpreted By:  Kevin Tanner, STUDY: XR CHEST 2 VIEWS  2/24/2025 2:51 pm   INDICATION: Signs/Symptoms:Patient is wheezing   COMPARISON: 01/12/2024   ACCESSION NUMBER(S): [id number]   ORDERING CLINICIAN: CARLOS REN   TECHNIQUE: PA and lateral views of the chest were obtained.   FINDINGS: Mild-to-moderate diffuse interstitial infiltrates are seen throughout the lungs bilaterally, and may  represent edema and/or pneumonia. No pleural effusion or pneumothorax is identified. The cardiac silhouette is within normal limits for size.  Mild discogenic degenerative changes are seen throughout the thoracic spine.       Diffuse interstitial infiltrates bilaterally, as above. Clinical correlation and continued follow-up until clearing is recommended.   MACRO: None.   Signed by: Kevin Tanner 2/24/2025 3:17 PM Dictation workstation:   [id number]     Diagnostic study results (if any) were reviewed by Maria Del Carmen Navarro PA-C.    Assessment/Plan   Allergies, medications, history, and pertinent labs/EKGs/Imaging reviewed by Maria Del Carmen Navarro PA-C.     Medical Decision Making  65 year old female with Pmhx of anxiety, GERD, tobacco use presents with complaint of 6 days nasal congestion, sinus pressure, cough, wheezing.  No CP or SOB.  No fevers.  No extremity swelling or pain.  Tobacco use for 40 years.  Exam with discrete left upper wheeze, no rales or rhonchi.  No extremity swelling or tenderness.  VSS.  Consider underlying lung disease related to lung term tobacco use contributory.  Treated in clinic with steroids with this consideration given wheezing.   CXR with diffuse interstitial infiltrates edema or infectious.  H&P not consistent with edema.  No extremity swelling or JVD, SOB, rales, history of CHF.  Suspect infectious, also consider inflammatory.  Prefer dual antibiotic coverage given tobacco use however patient reports sulfa, levofloxacin, PCN allergies.  PCN reported severe with throat swelling and does not think she has ever taken cephalosporin.  As VSS, no fevers, will trial treatment with doxycycline.  Discussed strict presentation to ED for new or worsening symptoms - CP, SOB.  Follow up with PCP if no improvement by 48 hours.  As long as she is feeling better, follow up for repeat CXR in 4 weeks with PCP.  Encouraged follow-up with primary care provider.  Discussed expected course, indications for  return or for presentation to emergency department.  Discharged good condition agreeable to plan as discussed.      Orders and Diagnoses  Diagnoses and all orders for this visit:  Acute ethmoidal sinusitis, recurrence not specified  -     doxycycline (Vibra-Tabs) 100 mg tablet; Take 1 tablet (100 mg) by mouth 2 times a day for 7 days. Take with a full glass of water and do not lie down for at least 30 minutes after.  -     cetirizine (ZyrTEC) 10 mg tablet; Take 1 tablet (10 mg) by mouth once daily.  -     fluticasone (Flonase) 50 mcg/actuation nasal spray; Administer 1 spray into each nostril once daily. Shake gently. Before first use, prime pump. After use, clean tip and replace cap.  Acute cough  -     POCT Influenza A/B manually resulted  -     XR chest 2 views; Future  -     benzonatate (Tessalon) 200 mg capsule; Take 1 capsule (200 mg) by mouth 3 times a day as needed for cough for up to 7 days. Do not crush or chew.  -     doxycycline (Vibra-Tabs) 100 mg tablet; Take 1 tablet (100 mg) by mouth 2 times a day for 7 days. Take with a full glass of water and do not lie down for at least 30 minutes after.  -     albuterol 90 mcg/actuation inhaler; Inhale 2 puffs every 6 hours if needed for wheezing.  Tobacco use  Wheezing  -     predniSONE (Deltasone) tablet 60 mg  -     predniSONE (Deltasone) 20 mg tablet; Take 2 tablets (40 mg) by mouth once daily for 5 days.      Medical Admin Record  Administrations This Visit       predniSONE (Deltasone) tablet 60 mg       Admin Date  02/24/2025 Action  Given Dose  60 mg Route  oral Documented By  Shabnam Sánchez MA                    Patient disposition: Home    Electronically signed by Maria Del Carmen Navarro PA-C  5:25 PM

## 2025-03-05 DIAGNOSIS — E78.5 HYPERLIPIDEMIA, UNSPECIFIED HYPERLIPIDEMIA TYPE: ICD-10-CM

## 2025-03-05 RX ORDER — SPIRONOLACTONE 50 MG/1
50 TABLET, FILM COATED ORAL DAILY
Qty: 90 TABLET | Refills: 0 | Status: SHIPPED | OUTPATIENT
Start: 2025-03-05

## 2025-03-14 ENCOUNTER — OFFICE VISIT (OUTPATIENT)
Dept: PRIMARY CARE | Facility: CLINIC | Age: 65
End: 2025-03-14
Payer: MEDICARE

## 2025-03-14 VITALS
OXYGEN SATURATION: 97 % | SYSTOLIC BLOOD PRESSURE: 128 MMHG | TEMPERATURE: 98 F | DIASTOLIC BLOOD PRESSURE: 80 MMHG | RESPIRATION RATE: 16 BRPM | HEART RATE: 78 BPM

## 2025-03-14 DIAGNOSIS — F32.A ANXIETY AND DEPRESSION: ICD-10-CM

## 2025-03-14 DIAGNOSIS — R05.2 SUBACUTE COUGH: Primary | ICD-10-CM

## 2025-03-14 DIAGNOSIS — F41.1 GENERALIZED ANXIETY DISORDER: ICD-10-CM

## 2025-03-14 DIAGNOSIS — F41.9 ANXIETY AND DEPRESSION: ICD-10-CM

## 2025-03-14 PROCEDURE — 1159F MED LIST DOCD IN RCRD: CPT | Performed by: FAMILY MEDICINE

## 2025-03-14 PROCEDURE — 3074F SYST BP LT 130 MM HG: CPT | Performed by: FAMILY MEDICINE

## 2025-03-14 PROCEDURE — 1160F RVW MEDS BY RX/DR IN RCRD: CPT | Performed by: FAMILY MEDICINE

## 2025-03-14 PROCEDURE — 99214 OFFICE O/P EST MOD 30 MIN: CPT | Performed by: FAMILY MEDICINE

## 2025-03-14 PROCEDURE — 1126F AMNT PAIN NOTED NONE PRSNT: CPT | Performed by: FAMILY MEDICINE

## 2025-03-14 PROCEDURE — 3079F DIAST BP 80-89 MM HG: CPT | Performed by: FAMILY MEDICINE

## 2025-03-14 RX ORDER — PAROXETINE HYDROCHLORIDE 40 MG/1
40 TABLET, FILM COATED ORAL EVERY MORNING
Qty: 90 TABLET | Refills: 1 | Status: SHIPPED | OUTPATIENT
Start: 2025-03-14

## 2025-03-14 ASSESSMENT — PATIENT HEALTH QUESTIONNAIRE - PHQ9
1. LITTLE INTEREST OR PLEASURE IN DOING THINGS: MORE THAN HALF THE DAYS
4. FEELING TIRED OR HAVING LITTLE ENERGY: SEVERAL DAYS
SUM OF ALL RESPONSES TO PHQ QUESTIONS 1-9: 11
3. TROUBLE FALLING OR STAYING ASLEEP: SEVERAL DAYS
SUM OF ALL RESPONSES TO PHQ9 QUESTIONS 1 & 2: 5
5. POOR APPETITE OR OVEREATING: SEVERAL DAYS
2. FEELING DOWN, DEPRESSED OR HOPELESS: NEARLY EVERY DAY
9. THOUGHTS THAT YOU WOULD BE BETTER OFF DEAD, OR OF HURTING YOURSELF: NOT AT ALL
6. FEELING BAD ABOUT YOURSELF - OR THAT YOU ARE A FAILURE OR HAVE LET YOURSELF OR YOUR FAMILY DOWN: SEVERAL DAYS
7. TROUBLE CONCENTRATING ON THINGS, SUCH AS READING THE NEWSPAPER OR WATCHING TELEVISION: SEVERAL DAYS
10. IF YOU CHECKED OFF ANY PROBLEMS, HOW DIFFICULT HAVE THESE PROBLEMS MADE IT FOR YOU TO DO YOUR WORK, TAKE CARE OF THINGS AT HOME, OR GET ALONG WITH OTHER PEOPLE: SOMEWHAT DIFFICULT
8. MOVING OR SPEAKING SO SLOWLY THAT OTHER PEOPLE COULD HAVE NOTICED. OR THE OPPOSITE, BEING SO FIGETY OR RESTLESS THAT YOU HAVE BEEN MOVING AROUND A LOT MORE THAN USUAL: SEVERAL DAYS

## 2025-03-14 ASSESSMENT — ENCOUNTER SYMPTOMS
DEPRESSION: 0
OCCASIONAL FEELINGS OF UNSTEADINESS: 0
LOSS OF SENSATION IN FEET: 0
SHORTNESS OF BREATH: 1
WHEEZING: 1

## 2025-03-14 ASSESSMENT — PAIN SCALES - GENERAL: PAINLEVEL_OUTOF10: 0-NO PAIN

## 2025-03-14 NOTE — PROGRESS NOTES
Outpatient Visit Note    Chief Complaint   Patient presents with    med fu       HPI:  Judith Gr is a 65 y.o. female here to follow-up on her medications    Has been undergoing a lot of family stressors and has been having anxiety for which we tried Lexapro but a whole tablet made her not feel like doing anything.  When I saw her last month she went down to half a tablet and had some energy back.  Was okay on Paxil in the past, had been on it for 2 years.  So I switched her from Lexapro to 20 mg of Paxil. Says the Paxil is helping but she has been under even more stress. Is less tearful.  diagnosed with cancer. Sister diagnosed with cancer.     Today she states that she is still having lingering URI symptoms with productive cough and sinus pressure. 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 Coughing spells are gone now. Currently on cetrizine, flonase, and albuterol HFA (taking 2 puffs every 4-6 hours).     Did prednisone and an antibiotic (early last week).     Saw cardiology, says all is stable and looks good. He thinks she may have underlying lung pathology.    PHQ9/GAD7:  Over the past 2 weeks, how often have you been bothered by any of the following problems?  Trouble falling or staying asleep, or sleeping too much: Several days  Feeling tired or having little energy: Several days  Poor appetite or overeating: Several days  Feeling bad about yourself - or that you are a failure or have let yourself or your family down: Several days  Trouble concentrating on things, such as reading the newspaper or watching television: Several days  Moving or speaking so slowly that other people could have noticed? Or the opposite - being so fidgety or restless that you have been moving around a lot more than usual.: Several days  Thoughts that you would be better off dead or hurting yourself in some way: Not at all  Patient Health Questionnaire-9 Score: 11      Patient Active Problem List     Diagnosis Date Noted    Ocular headache 01/02/2025    RLQ abdominal pain 10/18/2024    Anxiety disorder 04/10/2023    Arthralgia of both knees 04/10/2023    Arthralgia of multiple joints 04/10/2023    GERD (gastroesophageal reflux disease) 04/10/2023    Hyperlipidemia 04/10/2023    Hypertension 04/10/2023    Lower back pain 04/10/2023    Other allergic rhinitis 04/10/2023    Sensorineural hearing loss, bilateral 04/10/2023    Tinnitus, subjective, bilateral 04/10/2023        Past Medical History:   Diagnosis Date    Abnormal levels of other serum enzymes 07/30/2015    Elevated liver enzymes    Left lower quadrant pain 11/12/2013    Abdominal pain, LLQ (left lower quadrant)    Other fecal abnormalities 05/15/2014    Loose stools    Other specified anxiety disorders 10/24/2017    Depression with anxiety    Pain in joints of right hand 05/08/2018    Arthralgia of right hand    Pain in unspecified shoulder 05/14/2014    Shoulder pain    Personal history of other diseases of the circulatory system 05/15/2014    History of superficial phlebitis    Personal history of other diseases of the nervous system and sense organs 07/30/2015    History of tinnitus    Personal history of other diseases of the respiratory system 08/05/2013    History of sinusitis    Personal history of other specified conditions 04/09/2014    History of chest pain    Personal history of other specified conditions 05/15/2014    History of epigastric pain    Personal history of urinary (tract) infections 11/12/2013    History of urinary tract infection        Current Medications  Current Outpatient Medications   Medication Instructions    albuterol 90 mcg/actuation inhaler 2 puffs, inhalation, Every 6 hours PRN    cetirizine (ZYRTEC) 10 mg, oral, Daily    dexAMETHasone (DECADRON) 6 mg, oral, 2 times daily    fluticasone (Flonase) 50 mcg/actuation nasal spray 2 sprays, Daily    fluticasone (Flonase) 50 mcg/actuation nasal spray 1 spray, Each Nostril,  Daily, Shake gently. Before first use, prime pump. After use, clean tip and replace cap.    ibuprofen (Motrin) capsule 2 times daily    meclizine (ANTIVERT) 25 mg, 3 times daily PRN    metoprolol succinate XL (TOPROL-XL) 50 mg, oral, Daily    ondansetron ODT (Zofran-ODT) 4 mg disintegrating tablet dissolve 1 tablet by mouth three times a day if needed for nausea    PARoxetine (PAXIL) 40 mg, oral, Every morning    rosuvastatin (CRESTOR) 5 mg, oral, Daily    spironolactone (ALDACTONE) 50 mg, oral, Daily        Allergies  Allergies   Allergen Reactions    Penicillins Swelling     Reported throat swelling per patient     Other Unknown    Sulfa (Sulfonamide Antibiotics) Hives    Levofloxacin Rash        Past Surgical History:   Procedure Laterality Date    OTHER SURGICAL HISTORY  01/21/2022    Cholecystectomy    OTHER SURGICAL HISTORY  01/21/2022    Forearm fracture repair    OTHER SURGICAL HISTORY  01/21/2022    Total hysterectomy with removal of both tubes and ovaries    OTHER SURGICAL HISTORY  01/21/2022    Appendectomy     Family History   Problem Relation Name Age of Onset    Coronary artery disease Mother      Hyperlipidemia Mother      Other (carotid artery) Mother      Dementia Father      Diabetes Father       Social History     Tobacco Use    Smoking status: Every Day     Current packs/day: 1.00     Average packs/day: 1 pack/day for 40.0 years (40.0 ttl pk-yrs)     Types: Cigarettes    Smokeless tobacco: Never   Vaping Use    Vaping status: Never Used   Substance Use Topics    Alcohol use: Not Currently    Drug use: Not Currently     Tobacco Use: High Risk (3/14/2025)    Patient History     Smoking Tobacco Use: Every Day     Smokeless Tobacco Use: Never     Passive Exposure: Not on file        ROS  All pertinent positive symptoms are included in the history of present illness.  All other systems have been reviewed and are negative and noncontributory to this patient's current ailments.    VITAL SIGNS  Vitals:     03/14/25 1053   BP: 128/80   Pulse: 78   Resp: 16   Temp: 36.7 °C (98 °F)   SpO2: 97%     There were no vitals filed for this visit.   There is no height or weight on file to calculate BMI.     PHYSICAL EXAM  GENERAL APPEARANCE: well nourished, well developed, looks like stated age, in no acute distress, not ill or tired appearing, conversing well.   HEENT: no trauma, normocephalic. TMS intact with no effusion.   NECK: supple without rigidity, no neck mass was observed.   LUNGS: good chest wall expansion. In no acute respiratory distress. No conversational dyspnea.   EXTREMITIES: moving all extremities equally with no edema.   SKIN: normal skin color and pigmentation, without rash.   NEUROLOGIC EXAM: CN II-XII grossly intact, normal gait.   PSYCH: mood and affect appropriate; alert and oriented to time, place, person; no difficulty with speech or language.     Counseling:       Medication education:           Education:  The patient is counseled regarding potential side-effects of all new medications          Understanding:  Patient expressed understanding of information conveyed today          Adherence:  No barriers to adherence identified     Assessment/Plan   Problem List Items Addressed This Visit             ICD-10-CM    Anxiety disorder F41.9    Relevant Medications    PARoxetine (Paxil) 40 mg tablet     Other Visit Diagnoses         Codes    Subacute cough    -  Primary R05.2    Relevant Orders    XR chest 2 views    Anxiety and depression     F41.9, F32.A    Relevant Medications    PARoxetine (Paxil) 40 mg tablet            Additional Visit Plans:  I think you are on the road to recovery. Symptoms should continue to lift. Switch inhaler to as needed now. Continue cetirizine and flonase as needed. If not fully recovered by 4 weeks from now lets call to get in with pulmonology and also repeat a chest xray.     Take 500-1000mg tylenol 3-4 times a day as needed for pain.     Call Dr. Cortez's office and ask  him if the large discrepancy in blood pressure between your two arms is a concern.     Consider in person or video visit with  me mid to late April for follow up.     Patient Care Team:  Antonio Pablo DO as PCP - General (Family Medicine)  Alan Cortez MD as Consulting Physician (Cardiology)    Antonio Pablo DO   03/14/25   11:28 AM

## 2025-03-14 NOTE — PATIENT INSTRUCTIONS
Problem List Items Addressed This Visit             ICD-10-CM    Anxiety disorder F41.9    Relevant Medications    PARoxetine (Paxil) 40 mg tablet     Other Visit Diagnoses         Codes    Subacute cough    -  Primary R05.2    Relevant Orders    XR chest 2 views    Anxiety and depression     F41.9, F32.A    Relevant Medications    PARoxetine (Paxil) 40 mg tablet            Additional Visit Plans:  I think you are on the road to recovery. Symptoms should continue to lift. Switch inhaler to as needed now. Continue cetirizine and flonase as needed. If not fully recovered by 4 weeks from now lets call to get in with pulmonology and also repeat a chest xray.     Take 500-1000mg tylenol 3-4 times a day as needed for pain.     Call Dr. Cortez's office and ask him if the large discrepancy in blood pressure between your two arms is a concern.     Consider in person or video visit with  me mid to late April for follow up.     Patient Care Team:  Antonio Pablo DO as PCP - General (Family Medicine)  Alan Cortez MD as Consulting Physician (Cardiology)    Antonio Pablo DO   03/14/25   11:28 AM

## 2025-03-17 ENCOUNTER — APPOINTMENT (OUTPATIENT)
Dept: PRIMARY CARE | Facility: CLINIC | Age: 65
End: 2025-03-17
Payer: MEDICARE

## 2025-03-18 ENCOUNTER — PATIENT MESSAGE (OUTPATIENT)
Dept: PRIMARY CARE | Facility: CLINIC | Age: 65
End: 2025-03-18
Payer: MEDICARE

## 2025-03-18 DIAGNOSIS — R05.1 ACUTE COUGH: ICD-10-CM

## 2025-03-18 DIAGNOSIS — J01.20 ACUTE ETHMOIDAL SINUSITIS, RECURRENCE NOT SPECIFIED: ICD-10-CM

## 2025-03-21 RX ORDER — CETIRIZINE HYDROCHLORIDE 10 MG/1
10 TABLET ORAL DAILY
Qty: 30 TABLET | Refills: 0 | Status: SHIPPED | OUTPATIENT
Start: 2025-03-21 | End: 2025-04-20

## 2025-03-21 RX ORDER — ALBUTEROL SULFATE 90 UG/1
2 INHALANT RESPIRATORY (INHALATION) EVERY 6 HOURS PRN
Qty: 18 G | Refills: 0 | Status: SHIPPED | OUTPATIENT
Start: 2025-03-21 | End: 2026-03-21

## 2025-03-21 RX ORDER — BENZONATATE 200 MG/1
200 CAPSULE ORAL 3 TIMES DAILY PRN
Qty: 42 CAPSULE | Refills: 0 | Status: SHIPPED | OUTPATIENT
Start: 2025-03-21 | End: 2025-04-20

## 2025-04-17 ENCOUNTER — APPOINTMENT (OUTPATIENT)
Dept: PRIMARY CARE | Facility: CLINIC | Age: 65
End: 2025-04-17
Payer: MEDICARE

## 2025-06-05 ENCOUNTER — APPOINTMENT (OUTPATIENT)
Dept: PRIMARY CARE | Facility: CLINIC | Age: 65
End: 2025-06-05
Payer: MEDICARE

## 2025-06-20 ENCOUNTER — TELEPHONE (OUTPATIENT)
Dept: PRIMARY CARE | Facility: CLINIC | Age: 65
End: 2025-06-20
Payer: MEDICARE

## 2025-06-20 NOTE — TELEPHONE ENCOUNTER
I called patient and told her Dr. Pablo is no longer here. She is gonna call and see if she can fill her meds if not she will call us back. I let her know if the te cannot fill her meds we may be able to ask the [facility]. She stated she had a med apt in July and think [facility] will refill her meds

## 2025-08-05 ENCOUNTER — APPOINTMENT (OUTPATIENT)
Dept: ORTHOPEDIC SURGERY | Facility: CLINIC | Age: 65
End: 2025-08-05
Payer: MEDICARE

## 2025-08-05 DIAGNOSIS — M65.332 ACQUIRED TRIGGER FINGER OF LEFT MIDDLE FINGER: ICD-10-CM

## 2025-08-05 DIAGNOSIS — G56.22 CUBITAL TUNNEL SYNDROME ON LEFT: Primary | ICD-10-CM

## 2025-08-05 PROCEDURE — 1159F MED LIST DOCD IN RCRD: CPT | Performed by: ORTHOPAEDIC SURGERY

## 2025-08-05 PROCEDURE — 99213 OFFICE O/P EST LOW 20 MIN: CPT | Performed by: ORTHOPAEDIC SURGERY

## 2025-08-05 PROCEDURE — 1160F RVW MEDS BY RX/DR IN RCRD: CPT | Performed by: ORTHOPAEDIC SURGERY

## 2025-08-05 RX ORDER — TRAZODONE HYDROCHLORIDE 100 MG/1
100 TABLET ORAL NIGHTLY
COMMUNITY
Start: 2025-07-11

## 2025-08-05 ASSESSMENT — ENCOUNTER SYMPTOMS
FEVER: 0
EYE DISCHARGE: 0
ARTHRALGIAS: 1
SINUS PRESSURE: 0
JOINT SWELLING: 1
CHILLS: 0
SHORTNESS OF BREATH: 0
TROUBLE SWALLOWING: 0
WHEEZING: 0
COLOR CHANGE: 0

## 2025-08-05 ASSESSMENT — PAIN SCALES - GENERAL: PAINLEVEL_OUTOF10: 0 - NO PAIN

## 2025-08-05 ASSESSMENT — PAIN - FUNCTIONAL ASSESSMENT: PAIN_FUNCTIONAL_ASSESSMENT: 0-10

## 2025-08-05 NOTE — PROGRESS NOTES
Reason for Appointment  Chief Complaint   Patient presents with    Left Middle Finger - Trigger Finger    Left Ring Finger - Numbness    Left Little Finger - Numbness     History of Present Illness  Patient is a 65 y.o. female here today for follow-up evaluation of her left long trigger finger and increased numbness in the left ring and small fingers.  She had an injection over a year ago for a left long trigger finger, the injection did help her up until a few months ago in the locking and catching has returned.  She has pain with heavy gripping and the finger will lock down on her.  He is also noticed some increased numbness in the left ring and small fingers over the last few months.  She does sit with her elbows bent frequently.  No significant numbness in the other fingers.  No other changes in her past medical history, allergies, or medications.    Medical History[1]    Surgical History[2]    Medication Documentation Review Audit       Reviewed by Libertad Ennis MA (Medical Assistant) on 25 at 1044      Medication Order Taking? Sig Documenting Provider Last Dose Status   albuterol 90 mcg/actuation inhaler [id number] Yes Inhale 2 puffs every 6 hours if needed for wheezing. Antonio Pablo, DO  Active   cetirizine (ZyrTEC) 10 mg tablet [id number]  Take 1 tablet (10 mg) by mouth once daily. Antonio Pablo,    25 2359   dexAMETHasone (Decadron) 6 mg tablet [id number]  Take 1 tablet (6 mg) by mouth 2 times a day for 5 days. Mike Patel PA-C   24 2359   fluticasone (Flonase) 50 mcg/actuation nasal spray [id number]  Administer 2 sprays into affected nostril(s) once daily. Historical Provider, MD  Active   fluticasone (Flonase) 50 mcg/actuation nasal spray [id number]  Administer 1 spray into each nostril once daily. Shake gently. Before first use, prime pump. After use, clean tip and replace cap. Maria Del Carmen Navarro PA-C   25 2359   ibuprofen (Motrin) capsule [id number]   Take by mouth twice a day. Historical Provider, MD  Active   meclizine (Antivert) 25 mg tablet [id number] Yes Take 1 tablet (25 mg) by mouth 3 times a day as needed for dizziness. Historical Provider, MD  Active   metoprolol succinate XL (Toprol-XL) 50 mg 24 hr tablet [id number] Yes TAKE 1 TABLET BY MOUTH EVERY DAY Antonio Pablo DO  Active   ondansetron ODT (Zofran-ODT) 4 mg disintegrating tablet [id number] Yes dissolve 1 tablet by mouth three times a day if needed for nausea Historical Provider, MD  Active   PARoxetine (Paxil) 40 mg tablet [id number]  Take 1 tablet (40 mg) by mouth once daily in the morning. Antonio Pablo DO  Active   rosuvastatin (Crestor) 5 mg tablet [id number] Yes Take 1 tablet (5 mg) by mouth once daily. Antonio Pablo DO  Active   spironolactone (Aldactone) 50 mg tablet [id number] Yes TAKE 1 TABLET BY MOUTH EVERY DAY Antonio Pablo, DO  Active                    RX Allergies[3]    Review of Systems   Constitutional:  Negative for chills and fever.   HENT:  Negative for nosebleeds, sinus pressure and trouble swallowing.    Eyes:  Negative for discharge.   Respiratory:  Negative for shortness of breath and wheezing.    Cardiovascular:  Negative for chest pain.   Musculoskeletal:  Positive for arthralgias and joint swelling.   Skin:  Negative for color change and pallor.   All other systems reviewed and are negative.    Exam   On exam she has good elbow motion, mildly positive Tinel's over the left ulnar nerve.  No significant atrophy in the hand.  She has active triggering of the left long finger, tenderness over the left long A1 pulley tendon sheath.  Good digital motion otherwise with no other triggering.  Decree sensation to light touch in the left small finger.  Good pulses and capillary refill in the upper extremity.    Assessment   Encounter Diagnoses   Name Primary?    Cubital tunnel syndrome on left Yes    Acquired trigger finger of left middle finger      Plan   We discussed  a possible trigger release in the future but with her increased numbness in the ulnar nerve distribution, we will get an EMG and neuromuscular ultrasound to evaluate for cubital tunnel syndrome as if there is significant ulnar nerve compression, that would be an indication for possible submuscular ulnar nerve transposition as well.  We will follow-up with her after the nerve test and discuss further intervention at that point.    I, Geeta Durán PA-C, am acting as a scribe and attest that this documentation has been prepared under the direction and in the presence of Uri Coyne MD.  By signing below, I, Uri Coyne MD, personally performed the services described in this documentation. All medical record entries made by the scribe were at my direction and in my presence. I have reviewed the chart and agree that the record reflects my personal performance and is accurate and complete.                         [1]   Past Medical History:  Diagnosis Date    Abnormal levels of other serum enzymes 07/30/2015    Elevated liver enzymes    Allergic rhinitis     Dental disease     Dizziness     GERD (gastroesophageal reflux disease)     HL (hearing loss) 9/1/2017    Left lower quadrant pain 11/12/2013    Abdominal pain, LLQ (left lower quadrant)    Migraine     Other fecal abnormalities 05/15/2014    Loose stools    Other specified anxiety disorders 10/24/2017    Depression with anxiety    Pain in joints of right hand 05/08/2018    Arthralgia of right hand    Pain in unspecified shoulder 05/14/2014    Shoulder pain    Personal history of other diseases of the circulatory system 05/15/2014    History of superficial phlebitis    Personal history of other diseases of the nervous system and sense organs 07/30/2015    History of tinnitus    Personal history of other diseases of the respiratory system 08/05/2013    History of sinusitis    Personal history of other specified conditions 04/09/2014    History of chest pain     Personal history of other specified conditions 05/15/2014    History of epigastric pain    Personal history of urinary (tract) infections 11/12/2013    History of urinary tract infection    Tinnitus    [2]   Past Surgical History:  Procedure Laterality Date    OTHER SURGICAL HISTORY  01/21/2022    Cholecystectomy    OTHER SURGICAL HISTORY  01/21/2022    Forearm fracture repair    OTHER SURGICAL HISTORY  01/21/2022    Total hysterectomy with removal of both tubes and ovaries    OTHER SURGICAL HISTORY  01/21/2022    Appendectomy   [3]   Allergies  Allergen Reactions    Penicillins Swelling     Reported throat swelling per patient     Other Unknown    Sulfa (Sulfonamide Antibiotics) Hives    Levofloxacin Rash

## 2025-08-22 ENCOUNTER — APPOINTMENT (OUTPATIENT)
Dept: NEUROLOGY | Facility: HOSPITAL | Age: 65
End: 2025-08-22
Payer: MEDICARE

## 2025-08-22 ENCOUNTER — HOSPITAL ENCOUNTER (OUTPATIENT)
Dept: NEUROLOGY | Facility: HOSPITAL | Age: 65
Discharge: HOME | End: 2025-08-22
Payer: MEDICARE

## 2025-08-22 DIAGNOSIS — G56.22 CUBITAL TUNNEL SYNDROME ON LEFT: ICD-10-CM

## 2025-08-22 DIAGNOSIS — R20.0 NUMBNESS AND TINGLING IN LEFT HAND: Primary | ICD-10-CM

## 2025-08-22 DIAGNOSIS — R20.2 NUMBNESS AND TINGLING IN LEFT HAND: Primary | ICD-10-CM

## 2025-08-22 PROCEDURE — 95909 NRV CNDJ TST 5-6 STUDIES: CPT | Performed by: STUDENT IN AN ORGANIZED HEALTH CARE EDUCATION/TRAINING PROGRAM

## 2025-08-22 PROCEDURE — 95886 MUSC TEST DONE W/N TEST COMP: CPT | Performed by: STUDENT IN AN ORGANIZED HEALTH CARE EDUCATION/TRAINING PROGRAM

## 2025-08-22 PROCEDURE — 76883 US NRV&ACC STRUX 1XTR COMPRE: CPT | Performed by: STUDENT IN AN ORGANIZED HEALTH CARE EDUCATION/TRAINING PROGRAM

## 2025-09-02 ENCOUNTER — OFFICE VISIT (OUTPATIENT)
Dept: ORTHOPEDIC SURGERY | Facility: CLINIC | Age: 65
End: 2025-09-02
Payer: MEDICARE

## 2025-09-02 DIAGNOSIS — G56.22 CUBITAL TUNNEL SYNDROME ON LEFT: Primary | ICD-10-CM

## 2025-09-02 DIAGNOSIS — M65.332 TRIGGER FINGER, LEFT MIDDLE FINGER: ICD-10-CM

## 2025-09-02 PROCEDURE — 1125F AMNT PAIN NOTED PAIN PRSNT: CPT | Performed by: ORTHOPAEDIC SURGERY

## 2025-09-02 PROCEDURE — 1160F RVW MEDS BY RX/DR IN RCRD: CPT | Performed by: ORTHOPAEDIC SURGERY

## 2025-09-02 PROCEDURE — 99213 OFFICE O/P EST LOW 20 MIN: CPT | Performed by: ORTHOPAEDIC SURGERY

## 2025-09-02 PROCEDURE — 1159F MED LIST DOCD IN RCRD: CPT | Performed by: ORTHOPAEDIC SURGERY

## 2025-09-02 ASSESSMENT — PAIN SCALES - GENERAL: PAINLEVEL_OUTOF10: 4

## 2025-09-02 ASSESSMENT — PAIN - FUNCTIONAL ASSESSMENT: PAIN_FUNCTIONAL_ASSESSMENT: 0-10

## 2025-09-02 ASSESSMENT — ENCOUNTER SYMPTOMS
JOINT SWELLING: 1
CHILLS: 0
WHEEZING: 0
SHORTNESS OF BREATH: 0
FEVER: 0
TROUBLE SWALLOWING: 0
EYE DISCHARGE: 0
ARTHRALGIAS: 1